# Patient Record
Sex: FEMALE | Race: WHITE | NOT HISPANIC OR LATINO | Employment: FULL TIME | URBAN - METROPOLITAN AREA
[De-identification: names, ages, dates, MRNs, and addresses within clinical notes are randomized per-mention and may not be internally consistent; named-entity substitution may affect disease eponyms.]

---

## 2017-06-28 ENCOUNTER — RECORDS - HEALTHEAST (OUTPATIENT)
Dept: LAB | Facility: CLINIC | Age: 52
End: 2017-06-28

## 2017-06-28 LAB
CHOLEST SERPL-MCNC: 230 MG/DL
FASTING STATUS PATIENT QL REPORTED: NO
HDLC SERPL-MCNC: 57 MG/DL
LDLC SERPL CALC-MCNC: 136 MG/DL
TRIGL SERPL-MCNC: 184 MG/DL

## 2017-08-23 ENCOUNTER — TRANSFERRED RECORDS (OUTPATIENT)
Dept: HEALTH INFORMATION MANAGEMENT | Facility: CLINIC | Age: 52
End: 2017-08-23

## 2018-12-05 ENCOUNTER — RECORDS - HEALTHEAST (OUTPATIENT)
Dept: LAB | Facility: CLINIC | Age: 53
End: 2018-12-05

## 2018-12-05 LAB
CHOLEST SERPL-MCNC: 273 MG/DL
FASTING STATUS PATIENT QL REPORTED: ABNORMAL
HDLC SERPL-MCNC: 78 MG/DL
LDLC SERPL CALC-MCNC: 172 MG/DL
TRIGL SERPL-MCNC: 115 MG/DL
TSH SERPL DL<=0.005 MIU/L-ACNC: 1.9 UIU/ML (ref 0.3–5)

## 2018-12-06 LAB
HPV SOURCE: NORMAL
HUMAN PAPILLOMA VIRUS 16 DNA: NEGATIVE
HUMAN PAPILLOMA VIRUS 18 DNA: NEGATIVE
HUMAN PAPILLOMA VIRUS FINAL DIAGNOSIS: NORMAL
HUMAN PAPILLOMA VIRUS OTHER HR: NEGATIVE
SPECIMEN DESCRIPTION: NORMAL

## 2018-12-12 LAB
BKR LAB AP ABNORMAL BLEEDING: NO
BKR LAB AP BIRTH CONTROL/HORMONES: NORMAL
BKR LAB AP CERVICAL APPEARANCE: NORMAL
BKR LAB AP GYN ADEQUACY: NORMAL
BKR LAB AP GYN INTERPRETATION: NORMAL
BKR LAB AP HPV REFLEX: NORMAL
BKR LAB AP LMP: NORMAL
BKR LAB AP PATIENT STATUS: NORMAL
BKR LAB AP PREVIOUS ABNORMAL: NORMAL
BKR LAB AP PREVIOUS NORMAL: 2014
HIGH RISK?: NO
PATH REPORT.COMMENTS IMP SPEC: NORMAL
RESULT FLAG (HE HISTORICAL CONVERSION): NORMAL

## 2019-03-06 ENCOUNTER — RECORDS - HEALTHEAST (OUTPATIENT)
Dept: LAB | Facility: CLINIC | Age: 54
End: 2019-03-06

## 2019-03-07 LAB — 25(OH)D3 SERPL-MCNC: 70.3 NG/ML (ref 30–80)

## 2020-10-08 ENCOUNTER — RECORDS - HEALTHEAST (OUTPATIENT)
Dept: LAB | Facility: CLINIC | Age: 55
End: 2020-10-08

## 2020-10-09 LAB
MEV IGG SER IA-ACNC: POSITIVE
MUV IGG SER QL IA: POSITIVE
RUBV IGG SERPL QL IA: POSITIVE

## 2021-06-16 ENCOUNTER — RECORDS - HEALTHEAST (OUTPATIENT)
Dept: LAB | Facility: CLINIC | Age: 56
End: 2021-06-16

## 2021-06-16 LAB
SARS-COV-2 PCR COMMENT: NORMAL
SARS-COV-2 RNA SPEC QL NAA+PROBE: NEGATIVE
SARS-COV-2 VIRUS SPECIMEN SOURCE: NORMAL

## 2022-05-02 ENCOUNTER — LAB REQUISITION (OUTPATIENT)
Dept: LAB | Facility: CLINIC | Age: 57
End: 2022-05-02

## 2022-05-02 DIAGNOSIS — R25.3 FASCICULATION: ICD-10-CM

## 2022-05-02 DIAGNOSIS — E78.2 MIXED HYPERLIPIDEMIA: ICD-10-CM

## 2022-05-02 LAB
ANION GAP SERPL CALCULATED.3IONS-SCNC: 11 MMOL/L (ref 5–18)
BUN SERPL-MCNC: 8 MG/DL (ref 8–22)
CALCIUM SERPL-MCNC: 9.4 MG/DL (ref 8.5–10.5)
CHLORIDE BLD-SCNC: 105 MMOL/L (ref 98–107)
CHOLEST SERPL-MCNC: 298 MG/DL
CO2 SERPL-SCNC: 24 MMOL/L (ref 22–31)
CREAT SERPL-MCNC: 0.85 MG/DL (ref 0.6–1.1)
GFR SERPL CREATININE-BSD FRML MDRD: 80 ML/MIN/1.73M2
GLUCOSE BLD-MCNC: 92 MG/DL (ref 70–125)
HDLC SERPL-MCNC: 73 MG/DL
LDLC SERPL CALC-MCNC: 195 MG/DL
MAGNESIUM SERPL-MCNC: 2 MG/DL (ref 1.8–2.6)
POTASSIUM BLD-SCNC: 4.9 MMOL/L (ref 3.5–5)
SODIUM SERPL-SCNC: 140 MMOL/L (ref 136–145)
TRIGL SERPL-MCNC: 150 MG/DL

## 2022-05-02 PROCEDURE — 83735 ASSAY OF MAGNESIUM: CPT | Performed by: FAMILY MEDICINE

## 2022-05-02 PROCEDURE — 80048 BASIC METABOLIC PNL TOTAL CA: CPT | Performed by: FAMILY MEDICINE

## 2022-05-02 PROCEDURE — 80061 LIPID PANEL: CPT | Performed by: FAMILY MEDICINE

## 2022-06-11 ENCOUNTER — TRANSFERRED RECORDS (OUTPATIENT)
Dept: MULTI SPECIALTY CLINIC | Facility: CLINIC | Age: 57
End: 2022-06-11

## 2022-07-15 ENCOUNTER — LAB REQUISITION (OUTPATIENT)
Dept: LAB | Facility: CLINIC | Age: 57
End: 2022-07-15
Payer: COMMERCIAL

## 2022-07-15 DIAGNOSIS — G44.52 NEW DAILY PERSISTENT HEADACHE (NDPH): ICD-10-CM

## 2022-07-15 DIAGNOSIS — Z20.822 CONTACT WITH AND (SUSPECTED) EXPOSURE TO COVID-19: ICD-10-CM

## 2022-07-15 PROCEDURE — U0003 INFECTIOUS AGENT DETECTION BY NUCLEIC ACID (DNA OR RNA); SEVERE ACUTE RESPIRATORY SYNDROME CORONAVIRUS 2 (SARS-COV-2) (CORONAVIRUS DISEASE [COVID-19]), AMPLIFIED PROBE TECHNIQUE, MAKING USE OF HIGH THROUGHPUT TECHNOLOGIES AS DESCRIBED BY CMS-2020-01-R: HCPCS | Mod: ORL | Performed by: FAMILY MEDICINE

## 2022-07-17 LAB — SARS-COV-2 RNA RESP QL NAA+PROBE: NEGATIVE

## 2022-11-04 ENCOUNTER — LAB REQUISITION (OUTPATIENT)
Dept: LAB | Facility: CLINIC | Age: 57
End: 2022-11-04

## 2022-11-04 DIAGNOSIS — E78.2 MIXED HYPERLIPIDEMIA: ICD-10-CM

## 2022-11-04 PROCEDURE — 80053 COMPREHEN METABOLIC PANEL: CPT | Performed by: PHYSICIAN ASSISTANT

## 2022-11-04 PROCEDURE — 80061 LIPID PANEL: CPT | Performed by: PHYSICIAN ASSISTANT

## 2022-11-04 PROCEDURE — 84295 ASSAY OF SERUM SODIUM: CPT | Performed by: PHYSICIAN ASSISTANT

## 2022-11-06 LAB
ALBUMIN SERPL BCG-MCNC: 4.4 G/DL (ref 3.5–5.2)
ALP SERPL-CCNC: 58 U/L (ref 35–104)
ALT SERPL W P-5'-P-CCNC: 19 U/L (ref 10–35)
ANION GAP SERPL CALCULATED.3IONS-SCNC: 14 MMOL/L (ref 7–15)
AST SERPL W P-5'-P-CCNC: 19 U/L (ref 10–35)
BILIRUB SERPL-MCNC: 0.3 MG/DL
BUN SERPL-MCNC: 10.7 MG/DL (ref 6–20)
CALCIUM SERPL-MCNC: 9.4 MG/DL (ref 8.6–10)
CHLORIDE SERPL-SCNC: 103 MMOL/L (ref 98–107)
CHOLEST SERPL-MCNC: 170 MG/DL
CREAT SERPL-MCNC: 0.71 MG/DL (ref 0.51–0.95)
DEPRECATED HCO3 PLAS-SCNC: 20 MMOL/L (ref 22–29)
GFR SERPL CREATININE-BSD FRML MDRD: >90 ML/MIN/1.73M2
GLUCOSE SERPL-MCNC: 81 MG/DL (ref 70–99)
HDLC SERPL-MCNC: 67 MG/DL
LDLC SERPL CALC-MCNC: 68 MG/DL
NONHDLC SERPL-MCNC: 103 MG/DL
POTASSIUM SERPL-SCNC: 4.2 MMOL/L (ref 3.4–5.3)
PROT SERPL-MCNC: 6.3 G/DL (ref 6.4–8.3)
SODIUM SERPL-SCNC: 137 MMOL/L (ref 136–145)
TRIGL SERPL-MCNC: 173 MG/DL

## 2022-12-05 ENCOUNTER — TRANSFERRED RECORDS (OUTPATIENT)
Dept: HEALTH INFORMATION MANAGEMENT | Facility: CLINIC | Age: 57
End: 2022-12-05

## 2024-01-26 ENCOUNTER — OFFICE VISIT (OUTPATIENT)
Dept: FAMILY MEDICINE | Facility: CLINIC | Age: 59
End: 2024-01-26
Payer: COMMERCIAL

## 2024-01-26 VITALS
SYSTOLIC BLOOD PRESSURE: 106 MMHG | RESPIRATION RATE: 14 BRPM | TEMPERATURE: 99 F | OXYGEN SATURATION: 95 % | DIASTOLIC BLOOD PRESSURE: 74 MMHG | HEART RATE: 80 BPM

## 2024-01-26 DIAGNOSIS — Z79.899 MEDICATION MANAGEMENT: ICD-10-CM

## 2024-01-26 DIAGNOSIS — E78.2 MIXED HYPERLIPIDEMIA: ICD-10-CM

## 2024-01-26 DIAGNOSIS — E04.1 THYROID NODULE: ICD-10-CM

## 2024-01-26 DIAGNOSIS — C43.9 MELANOMA OF SKIN (H): ICD-10-CM

## 2024-01-26 DIAGNOSIS — Z13.1 DIABETES MELLITUS SCREENING: ICD-10-CM

## 2024-01-26 DIAGNOSIS — G44.83 COUGH HEADACHE: ICD-10-CM

## 2024-01-26 DIAGNOSIS — Z12.4 CERVICAL CANCER SCREENING: ICD-10-CM

## 2024-01-26 DIAGNOSIS — Z00.00 ENCOUNTER FOR ROUTINE HISTORY AND PHYSICAL EXAM IN FEMALE: Primary | ICD-10-CM

## 2024-01-26 DIAGNOSIS — F33.0 MILD EPISODE OF RECURRENT MAJOR DEPRESSIVE DISORDER (H): ICD-10-CM

## 2024-01-26 DIAGNOSIS — N60.99 ATYPICAL DUCTAL HYPERPLASIA OF BREAST: ICD-10-CM

## 2024-01-26 DIAGNOSIS — R93.1 ELEVATED CORONARY ARTERY CALCIUM SCORE: ICD-10-CM

## 2024-01-26 DIAGNOSIS — F41.9 ANXIETY: ICD-10-CM

## 2024-01-26 LAB
ALBUMIN UR-MCNC: NEGATIVE MG/DL
APPEARANCE UR: CLEAR
BACTERIA #/AREA URNS HPF: ABNORMAL /HPF
BILIRUB UR QL STRIP: NEGATIVE
COLOR UR AUTO: YELLOW
GLUCOSE UR STRIP-MCNC: NEGATIVE MG/DL
HGB UR QL STRIP: NEGATIVE
KETONES UR STRIP-MCNC: NEGATIVE MG/DL
LEUKOCYTE ESTERASE UR QL STRIP: ABNORMAL
NITRATE UR QL: NEGATIVE
PH UR STRIP: 6.5 [PH] (ref 5–8)
RBC #/AREA URNS AUTO: ABNORMAL /HPF
SP GR UR STRIP: 1.01 (ref 1–1.03)
SQUAMOUS #/AREA URNS AUTO: ABNORMAL /LPF
UROBILINOGEN UR STRIP-ACNC: 0.2 E.U./DL
WBC #/AREA URNS AUTO: ABNORMAL /HPF

## 2024-01-26 PROCEDURE — 99386 PREV VISIT NEW AGE 40-64: CPT | Performed by: NURSE PRACTITIONER

## 2024-01-26 PROCEDURE — 81001 URINALYSIS AUTO W/SCOPE: CPT | Performed by: NURSE PRACTITIONER

## 2024-01-26 PROCEDURE — 87624 HPV HI-RISK TYP POOLED RSLT: CPT | Performed by: NURSE PRACTITIONER

## 2024-01-26 PROCEDURE — G0123 SCREEN CERV/VAG THIN LAYER: HCPCS | Performed by: NURSE PRACTITIONER

## 2024-01-26 PROCEDURE — 99214 OFFICE O/P EST MOD 30 MIN: CPT | Mod: 25 | Performed by: NURSE PRACTITIONER

## 2024-01-26 RX ORDER — SERTRALINE HYDROCHLORIDE 100 MG/1
200 TABLET, FILM COATED ORAL DAILY
COMMUNITY
Start: 2023-06-30

## 2024-01-26 RX ORDER — BUPROPION HYDROCHLORIDE 300 MG/1
1 TABLET ORAL DAILY
COMMUNITY
Start: 2023-10-21 | End: 2024-03-21

## 2024-01-26 RX ORDER — TOPIRAMATE 25 MG/1
25 TABLET, FILM COATED ORAL 2 TIMES DAILY
COMMUNITY
Start: 2023-10-24

## 2024-01-26 RX ORDER — ATORVASTATIN CALCIUM 40 MG/1
40 TABLET, FILM COATED ORAL DAILY
COMMUNITY
Start: 2022-07-21 | End: 2024-08-12

## 2024-01-26 ASSESSMENT — PAIN SCALES - GENERAL: PAINLEVEL: NO PAIN (0)

## 2024-01-26 NOTE — PROGRESS NOTES
Assessment and Plan:    Encounter for routine history and physical exam in female  Recommend consuming a healthy diet and exercising.  She declines HIV and hepatitis C screening.  Unfortunately, we do not have her vaccine record.  Will try to obtain this.   has had a colonoscopy in the last few years.  - CBC with platelets  - UA Macroscopic with reflex to Microscopic and Culture  - UA Macroscopic with reflex to Microscopic and Culture    Diabetes mellitus screening  - Hemoglobin A1c    Cervical cancer screening  - Pap Screen with HPV - recommended age 30 - 65 years    Atypical ductal hyperplasia of breast  Patient states she obtains breast MRIs and mammograms every 6 months.  Offered referral to breast specialist, but she declines.  She has this done at [facility].  - MR Breast Bilateral w/o & w Contrast  - MA Diagnostic Digital Bilateral    Mixed hyperlipidemia  She continues atorvastatin.  Goal LDL is less than 70.  - Lipid panel reflex to direct LDL Fasting    Thyroid nodule  Will check thyroid cascade.  She had a thyroid biopsy which was benign.  - TSH with free T4 reflex    Elevated coronary artery calcium score  Patient continues atorvastatin.  Will refer to cardiology.  - Adult Cardiology Eval  Referral    Melanoma of skin (H)  Patient is followed by dermatology.  - Adult Dermatology  Referral  - Adult Dermatology  Referral    Cough headache  Patient continues topiramate.  Patient is followed by neurology.  - Adult Neurology  Referral    Anxiety  Mild episode of recurrent major depressive disorder (H24)  Patient states this is controlled with bupropion and sertraline.    Medication management  - Comprehensive metabolic panel      Subjective:     Angelina is a 58 year old female presenting to the clinic for a female physical.     LMP: in her early 50's  Hx of abnormal pap smear: 22 years ago, colposcopy; normal since   Last pap smear: 2/5/18 normal, negative HPV    Perform self-breast exams: yes   Vaginal discharge or irritation: none  Sexually active: yes,  since 1994   Contraception: none   Concerns for STDs: none   Previous pregnancies:three pregnancies (two vaginal, 1 miscarriage)  One daughter and one son     Patient has a history of atypical ductal hyperplasia within the right breast 16 years ago via a breast biopsy.  The patient was seen at [facility] and has a breast MRI and mammogram every 6 months.  Patient has a history of an elevated cardiac calcium score of 145 on 6/21/2022.  She is taking atorvastatin 40 mg daily.  Occasionally, she developed shortness of breath with walking up a flight of stairs.  She denies chest pain or chest tightness.  Her father had elevated cholesterol and a stroke.  Patient is taking bupropion and sertraline for anxiety and depression.  She feels as though her mood is stable.  She denies thoughts of suicide.  She feels supported.  She sees neuro and neurology for a cough headache.  She takes topiramate daily.  She has a history of melanoma and is followed by advanced dermatology.    Review of systems:  I performed a 10 point review of systems.  All pertinent positives and negatives are noted in the HPI. All others are negative.     Allergies   Allergen Reactions    Morphine And Related Unknown     nausea    Upset stomach       Current Outpatient Medications   Medication    atorvastatin (LIPITOR) 40 MG tablet    buPROPion (WELLBUTRIN XL) 300 MG 24 hr tablet    Calcium Carbonate-Vitamin D 250-3.125 MG-MCG TABS    sertraline (ZOLOFT) 100 MG tablet    topiramate (TOPAMAX) 25 MG tablet     No current facility-administered medications for this visit.       Social History     Socioeconomic History    Marital status:      Spouse name: Not on file    Number of children: Not on file    Years of education: Not on file    Highest education level: Not on file   Occupational History    Not on file   Tobacco Use    Smoking status:  Never    Smokeless tobacco: Never   Vaping Use    Vaping Use: Never used   Substance and Sexual Activity    Alcohol use: Not Currently    Drug use: Never    Sexual activity: Not on file   Other Topics Concern    Not on file   Social History Narrative    Not on file     Social Determinants of Health     Financial Resource Strain: Low Risk  (1/26/2024)    Financial Resource Strain     Within the past 12 months, have you or your family members you live with been unable to get utilities (heat, electricity) when it was really needed?: No   Food Insecurity: Low Risk  (1/26/2024)    Food Insecurity     Within the past 12 months, did you worry that your food would run out before you got money to buy more?: No     Within the past 12 months, did the food you bought just not last and you didn t have money to get more?: No   Transportation Needs: Low Risk  (1/26/2024)    Transportation Needs     Within the past 12 months, has lack of transportation kept you from medical appointments, getting your medicines, non-medical meetings or appointments, work, or from getting things that you need?: No   Physical Activity: Not on file   Stress: Not on file   Social Connections: Not on file   Interpersonal Safety: Low Risk  (1/26/2024)    Interpersonal Safety     Do you feel physically and emotionally safe where you currently live?: Yes     Within the past 12 months, have you been hit, slapped, kicked or otherwise physically hurt by someone?: No     Within the past 12 months, have you been humiliated or emotionally abused in other ways by your partner or ex-partner?: No   Housing Stability: Low Risk  (1/26/2024)    Housing Stability     Do you have housing? : Yes     Are you worried about losing your housing?: No       History reviewed. No pertinent past medical history.    Family History   Problem Relation Age of Onset    Hyperlipidemia Father     Colon Cancer Father        Past Surgical History:   Procedure Laterality Date    BREAST  BIOPSY, CORE RT/LT      CHOLECYSTECTOMY      SURGICAL PATHOLOGY EXAM      THYROIDECTOMY, PARTIAL         Objective:     /74 (BP Location: Right arm, Patient Position: Sitting, Cuff Size: Adult Large)   Pulse 80   Temp 99  F (37.2  C) (Temporal)   Resp 14   SpO2 95%     Patient is alert, no obvious distress.   Skin: Warm, dry.  No rashes or lesions. Skin turgor rapid return.   HEENT:  Eyes normal.  Ears normal.  Nose patent, mucosa pink.  Oropharynx mucosa pink, no lesions or tonsil enlargement.   Neck:  Supple, without lymphadenopathy, bruits, JVD. Thyroid normal texture and size.    Lungs:  Clear to auscultation.  No wheezing, rales noted.  Respirations even and unlabored.   Heart:  Regular rate and rhythm.  No murmurs.   Breasts:  Normal.  No surrounding adenopathy.   Abdomen: Soft, nontender.  No organomegaly.  Bowel sounds normoactive.  No guarding or masses noted.   :  External genitalia normal.  Normal vaginal mucosa.  Cervix no lesions or cervical motion tenderness.  Musculoskeletal:  Full ROM of extremities.  Muscle strength equal +5/5.   Neurological:  Cranial nerves 2-12 intact.              Answers submitted by the patient for this visit:  General Questionnaire (Submitted on 1/26/2024)  Chief Complaint: Chronic problems general questions HPI Form  What is the reason for your visit today? : new patient  How many servings of fruits and vegetables do you eat daily?: 2-3  On average, how many sweetened beverages do you drink each day (Examples: soda, juice, sweet tea, etc.  Do NOT count diet or artificially sweetened beverages)?: 2  How many minutes a day do you exercise enough to make your heart beat faster?: 10 to 19  How many days a week do you exercise enough to make your heart beat faster?: 4  How many days per week do you miss taking your medication?: 7

## 2024-01-29 ENCOUNTER — TELEPHONE (OUTPATIENT)
Dept: FAMILY MEDICINE | Facility: CLINIC | Age: 59
End: 2024-01-29
Payer: COMMERCIAL

## 2024-01-29 NOTE — TELEPHONE ENCOUNTER
Writer called patient and states she goes to [facility] for all of her breast imaging.   Patient would like imaging orders sent to [facility] in [place].    MARCELO Dougherty, RN  [facility]

## 2024-01-29 NOTE — TELEPHONE ENCOUNTER
"Informed Mariam at [facility] of the following notes:  Per last OV notes on 1/26/24:  \"Atypical ductal hyperplasia of breast  Patient states she obtains breast MRIs and mammograms every 6 months.  Offered referral to breast specialist, but she declines.  She has [facility] done at [facility].  - MR Breast Bilateral w/o & w Contrast  - MA Diagnostic Digital Bilateral\"    And:  \"Patient has a history of atypical ductal hyperplasia within the right breast 16 years ago via a breast biopsy.  The patient was seen at [facility] and has a breast MRI and mammogram every 6 months.\"    If pt is not having new concerns or symptoms, then it will just be a screening not diagnostic mammogram per Mariam at the [facility]. They will need a screening mammogram order then.    Mariam can't tell if pt is to have breast MRI at [facility] or not, but they will need a screening mammogram before they can do the breast MRI or diagnostic with ultrasound (if having current symptoms).    Mariam does not think [facility] does breast MRI, so if pt is getting screening mammogram done through [facility], but needs breast MRI at [facility] then they need screening images and report from [facility].     Mariam is wondering if provider Bryanna is wanting the 3-D screening mammogram? If pt is not having new symptoms then they will not do the diagnostic mammogram and should only be a screening mammogram, which it can be done in 3-D if that's what provider wanted.    Routing to provider to review and advise.    Laura Robertson, WARRENN, RN  [facility]  "

## 2024-01-29 NOTE — TELEPHONE ENCOUNTER
General Call      Reason for Call:  order clarification-mammogram    What are your questions or concerns:  Mariam calling from imaging to clarify orders as unless a new symptom, this should be screening mammogram.    Please advise and /or send new order

## 2024-01-29 NOTE — TELEPHONE ENCOUNTER
Please reach out to patient regarding all of this.  Patient was insistent that she has mammograms and breast MRI's at [facility]. Please let me know what the patient decides and I can change the order. Thanks.

## 2024-01-30 ENCOUNTER — TELEPHONE (OUTPATIENT)
Dept: DERMATOLOGY | Facility: CLINIC | Age: 59
End: 2024-01-30
Payer: COMMERCIAL

## 2024-01-30 NOTE — TELEPHONE ENCOUNTER
Called and spoke with pt about her referral to   [facility]     Pt is established there and had no issues with scheduling.     Marking referral as complete.     Toshia Gudino, Procedure  1/30/2024 8:38 AM

## 2024-01-30 NOTE — TELEPHONE ENCOUNTER
Orders printed and faxed to [facility] in [place].    Leticia Corral, WARRENN, RN  [facility]

## 2024-02-01 ENCOUNTER — LAB (OUTPATIENT)
Dept: LAB | Facility: CLINIC | Age: 59
End: 2024-02-01
Payer: COMMERCIAL

## 2024-02-01 DIAGNOSIS — E78.2 MIXED HYPERLIPIDEMIA: ICD-10-CM

## 2024-02-01 DIAGNOSIS — Z13.1 DIABETES MELLITUS SCREENING: ICD-10-CM

## 2024-02-01 DIAGNOSIS — Z79.899 MEDICATION MANAGEMENT: ICD-10-CM

## 2024-02-01 DIAGNOSIS — E04.1 THYROID NODULE: ICD-10-CM

## 2024-02-01 DIAGNOSIS — Z00.00 ENCOUNTER FOR ROUTINE HISTORY AND PHYSICAL EXAM IN FEMALE: ICD-10-CM

## 2024-02-01 PROBLEM — R73.03 PREDIABETES: Status: ACTIVE | Noted: 2024-02-01

## 2024-02-01 LAB
BKR LAB AP GYN ADEQUACY: NORMAL
BKR LAB AP GYN INTERPRETATION: NORMAL
BKR LAB AP HPV REFLEX: NORMAL
BKR LAB AP PREVIOUS ABNORMAL: NORMAL
ERYTHROCYTE [DISTWIDTH] IN BLOOD BY AUTOMATED COUNT: 13.5 % (ref 10–15)
HBA1C MFR BLD: 5.8 % (ref 0–5.6)
HCT VFR BLD AUTO: 43.2 % (ref 35–47)
HGB BLD-MCNC: 14.5 G/DL (ref 11.7–15.7)
MCH RBC QN AUTO: 32.1 PG (ref 26.5–33)
MCHC RBC AUTO-ENTMCNC: 33.6 G/DL (ref 31.5–36.5)
MCV RBC AUTO: 96 FL (ref 78–100)
PATH REPORT.COMMENTS IMP SPEC: NORMAL
PATH REPORT.COMMENTS IMP SPEC: NORMAL
PATH REPORT.RELEVANT HX SPEC: NORMAL
PLATELET # BLD AUTO: 221 10E3/UL (ref 150–450)
RBC # BLD AUTO: 4.52 10E6/UL (ref 3.8–5.2)
WBC # BLD AUTO: 8 10E3/UL (ref 4–11)

## 2024-02-01 PROCEDURE — 85027 COMPLETE CBC AUTOMATED: CPT

## 2024-02-01 PROCEDURE — 80061 LIPID PANEL: CPT

## 2024-02-01 PROCEDURE — 36415 COLL VENOUS BLD VENIPUNCTURE: CPT

## 2024-02-01 PROCEDURE — 84443 ASSAY THYROID STIM HORMONE: CPT

## 2024-02-01 PROCEDURE — 80053 COMPREHEN METABOLIC PANEL: CPT

## 2024-02-01 PROCEDURE — 83036 HEMOGLOBIN GLYCOSYLATED A1C: CPT

## 2024-02-02 ENCOUNTER — TRANSFERRED RECORDS (OUTPATIENT)
Dept: HEALTH INFORMATION MANAGEMENT | Facility: CLINIC | Age: 59
End: 2024-02-02
Payer: COMMERCIAL

## 2024-02-02 LAB
ALBUMIN SERPL BCG-MCNC: 4.5 G/DL (ref 3.5–5.2)
ALP SERPL-CCNC: 69 U/L (ref 40–150)
ALT SERPL W P-5'-P-CCNC: 27 U/L (ref 0–50)
ANION GAP SERPL CALCULATED.3IONS-SCNC: 11 MMOL/L (ref 7–15)
AST SERPL W P-5'-P-CCNC: 25 U/L (ref 0–45)
BILIRUB SERPL-MCNC: 0.5 MG/DL
BUN SERPL-MCNC: 12.3 MG/DL (ref 6–20)
CALCIUM SERPL-MCNC: 9.5 MG/DL (ref 8.6–10)
CHLORIDE SERPL-SCNC: 111 MMOL/L (ref 98–107)
CHOLEST SERPL-MCNC: 212 MG/DL
CREAT SERPL-MCNC: 1.03 MG/DL (ref 0.51–0.95)
DEPRECATED HCO3 PLAS-SCNC: 22 MMOL/L (ref 22–29)
EGFRCR SERPLBLD CKD-EPI 2021: 63 ML/MIN/1.73M2
FASTING STATUS PATIENT QL REPORTED: YES
GLUCOSE SERPL-MCNC: 101 MG/DL (ref 70–99)
HDLC SERPL-MCNC: 81 MG/DL
HUMAN PAPILLOMA VIRUS 16 DNA: NEGATIVE
HUMAN PAPILLOMA VIRUS 18 DNA: NEGATIVE
HUMAN PAPILLOMA VIRUS FINAL DIAGNOSIS: NORMAL
HUMAN PAPILLOMA VIRUS OTHER HR: NEGATIVE
LDLC SERPL CALC-MCNC: 105 MG/DL
NONHDLC SERPL-MCNC: 131 MG/DL
POTASSIUM SERPL-SCNC: 4.8 MMOL/L (ref 3.4–5.3)
PROT SERPL-MCNC: 7 G/DL (ref 6.4–8.3)
SODIUM SERPL-SCNC: 144 MMOL/L (ref 135–145)
TRIGL SERPL-MCNC: 130 MG/DL
TSH SERPL DL<=0.005 MIU/L-ACNC: 3.56 UIU/ML (ref 0.3–4.2)

## 2024-02-05 DIAGNOSIS — R73.03 PREDIABETES: Primary | ICD-10-CM

## 2024-02-05 DIAGNOSIS — Z78.0 POSTMENOPAUSAL STATUS: Primary | ICD-10-CM

## 2024-02-05 PROBLEM — R87.619 ABNORMAL PAP SMEAR OF CERVIX: Status: ACTIVE | Noted: 2024-02-05

## 2024-02-15 ENCOUNTER — TRANSFERRED RECORDS (OUTPATIENT)
Dept: HEALTH INFORMATION MANAGEMENT | Facility: CLINIC | Age: 59
End: 2024-02-15
Payer: COMMERCIAL

## 2024-03-07 ENCOUNTER — VIRTUAL VISIT (OUTPATIENT)
Dept: EDUCATION SERVICES | Facility: CLINIC | Age: 59
End: 2024-03-07
Attending: NURSE PRACTITIONER
Payer: COMMERCIAL

## 2024-03-07 DIAGNOSIS — R73.03 PREDIABETES: ICD-10-CM

## 2024-03-07 PROCEDURE — 97802 MEDICAL NUTRITION INDIV IN: CPT | Mod: 95 | Performed by: NUTRITIONIST

## 2024-03-07 NOTE — LETTER
3/7/2024         RE: Angelina Sim  [address] [place] [address] [facility] [address]        Dear Colleague,    Thank you for referring your patient, Angelina Sim, to the [facility]. Please see a copy of my visit note below.    Medical Nutrition Therapy  Visit Type:Initial assessment and intervention    Angelina Sim presents today for MNT and education related to prediabetes.   She is accompanied by self.     ASSESSMENT:   Patient comments/concerns relating to nutrition: Kandy did not want to do an assessment on her Nutrition History was interested more in recommendations for keeping her from developing Type 2 diabetes.      NUTRITION HISTORY:  Patient declined    Eats out:  1 meals/per week     Previous diet education:  Yes     Food allergies/intolerances: No      EXERCISE: moderate regular exercise program which includes walking 20-30 minutes per day and no current resistance activity    SOCIO/ECONOMIC:   Lives with: spouse    MEDICATIONS:  Current Outpatient Medications   Medication     atorvastatin (LIPITOR) 40 MG tablet     buPROPion (WELLBUTRIN XL) 300 MG 24 hr tablet     Calcium Carbonate-Vitamin D 250-3.125 MG-MCG TABS     sertraline (ZOLOFT) 100 MG tablet     topiramate (TOPAMAX) 25 MG tablet     No current facility-administered medications for this visit.       LABS:  Lab Results   Component Value Date     02/01/2024      Lab Results   Component Value Date    POTASSIUM 4.8 02/01/2024    POTASSIUM 4.9 05/02/2022     Lab Results   Component Value Date    CHLORIDE 111 02/01/2024    CHLORIDE 105 05/02/2022     Lab Results   Component Value Date    ADILSON 9.5 02/01/2024     Lab Results   Component Value Date    CO2 22 02/01/2024    CO2 24 05/02/2022     Lab Results   Component Value Date    BUN 12.3 02/01/2024    BUN 8 05/02/2022     Lab Results   Component Value Date    CR 1.03 02/01/2024     Lab Results   Component Value Date     02/01/2024    GLC 92  "05/02/2022     Lab Results   Component Value Date     02/01/2024     Direct Measure HDL   Date Value Ref Range Status   02/01/2024 81 >=50 mg/dL Final   ]  GFR Estimate   Date Value Ref Range Status   02/01/2024 63 >60 mL/min/1.73m2 Final     Lab Results   Component Value Date    CR 1.03 02/01/2024     No results found for: \"MICROALBUMIN\"    ANTHROPOMETRICS:  Vitals: There were no vitals taken for this visit.  There is no height or weight on file to calculate BMI.      Wt Readings from Last 5 Encounters:   No data found for Wt       Weight Change: No weight on file, declines to be weighed    ESTIMATED KCAL REQUIREMENTS:  Unable to calculate     NUTRITION DIAGNOSIS: Food- and nutrition-related knowledge deficit related to unsupported nutrition beliefs/attitudes as evidenced by Inability to apply information    NUTRITION INTERVENTION:  Education given to support: general nutrition guidelines, carb counting, and portion control    PATIENT'S BEHAVIOR CHANGE GOALS:   See Patient Instructions for patient stated behavior change goals. AVS was printed and given to patient at today's appointment.    MONITOR / EVALUATE:  RD will monitor/evaluate:  Beliefs and attitudes related to food  Blood Glucose / A1c    FOLLOW-UP:  Follow-up appointment scheduled on 4/25/24.     Anne-Marie Barakat, CHARANJIT, LD, CDCES   Time spent in minutes: 60  Encounter: Individual      "

## 2024-03-07 NOTE — PROGRESS NOTES
Medical Nutrition Therapy  Visit Type:Initial assessment and intervention    Angelina Sim presents today for MNT and education related to prediabetes.   She is accompanied by self.     ASSESSMENT:   Patient comments/concerns relating to nutrition: Kandy did not want to do an assessment on her Nutrition History was interested more in recommendations for keeping her from developing Type 2 diabetes.      NUTRITION HISTORY:  Patient declined    Eats out:  1 meals/per week     Previous diet education:  Yes     Food allergies/intolerances: No      EXERCISE: moderate regular exercise program which includes walking 20-30 minutes per day and no current resistance activity    SOCIO/ECONOMIC:   Lives with: spouse    MEDICATIONS:  Current Outpatient Medications   Medication    atorvastatin (LIPITOR) 40 MG tablet    buPROPion (WELLBUTRIN XL) 300 MG 24 hr tablet    Calcium Carbonate-Vitamin D 250-3.125 MG-MCG TABS    sertraline (ZOLOFT) 100 MG tablet    topiramate (TOPAMAX) 25 MG tablet     No current facility-administered medications for this visit.       LABS:  Lab Results   Component Value Date     02/01/2024      Lab Results   Component Value Date    POTASSIUM 4.8 02/01/2024    POTASSIUM 4.9 05/02/2022     Lab Results   Component Value Date    CHLORIDE 111 02/01/2024    CHLORIDE 105 05/02/2022     Lab Results   Component Value Date    ADILSON 9.5 02/01/2024     Lab Results   Component Value Date    CO2 22 02/01/2024    CO2 24 05/02/2022     Lab Results   Component Value Date    BUN 12.3 02/01/2024    BUN 8 05/02/2022     Lab Results   Component Value Date    CR 1.03 02/01/2024     Lab Results   Component Value Date     02/01/2024    GLC 92 05/02/2022     Lab Results   Component Value Date     02/01/2024     Direct Measure HDL   Date Value Ref Range Status   02/01/2024 81 >=50 mg/dL Final   ]  GFR Estimate   Date Value Ref Range Status   02/01/2024 63 >60 mL/min/1.73m2 Final     Lab Results   Component  "Value Date    CR 1.03 02/01/2024     No results found for: \"MICROALBUMIN\"    ANTHROPOMETRICS:  Vitals: There were no vitals taken for this visit.  There is no height or weight on file to calculate BMI.      Wt Readings from Last 5 Encounters:   No data found for Wt       Weight Change: No weight on file, declines to be weighed    ESTIMATED KCAL REQUIREMENTS:  Unable to calculate     NUTRITION DIAGNOSIS: Food- and nutrition-related knowledge deficit related to unsupported nutrition beliefs/attitudes as evidenced by Inability to apply information    NUTRITION INTERVENTION:  Education given to support: general nutrition guidelines, carb counting, and portion control    PATIENT'S BEHAVIOR CHANGE GOALS:   See Patient Instructions for patient stated behavior change goals. AVS was printed and given to patient at today's appointment.    MONITOR / EVALUATE:  RD will monitor/evaluate:  Beliefs and attitudes related to food  Blood Glucose / A1c    FOLLOW-UP:  Follow-up appointment scheduled on 4/25/24.     Anne-Marie Barakat, CHARANJIT, LD, CDCES   Time spent in minutes: 60  Encounter: Individual    "

## 2024-03-07 NOTE — PATIENT INSTRUCTIONS
It was nice to talk with you today.  Here are some things we discussed:    Meal Plan Recommendation:   1) Continue with smaller portions    2) Limit carbohydrates at meals:  Aim for 3-4 carbohydrate choices per meal and 1 carbohydrate choice at snacks  (1 carbohydrate choice = 15 grams total carbohydrate)    3) Have protein with your meals, choose lean protein sources:  chicken without the skin, albacore tuna, turkey or ham slices, pork loin or pork chops with fat trimmed away, leanest hamburger you can purchase    4) Increase non starchy vegetables to 1/2 of your plate    5) Snacks ideas:  lean meat with string cheese and 4-5 whole wheat crackers, 1/2 cup cottage cheese with cup of fruit, hard boiled egg and 1/2 banana, small greek yogurt with handful of nuts    6) Discuss with PCP:  Referral to [facility], use of continuous glucose monitor, weight loss medications:  Wegovy or Zepbound    *Use diabetesEngage.org or [employer] for recipe and meal planning ideas  *Use [employer] peewee for carbohydrate reference.     I will send you a meal planning resources through the mail.    Your next appointment with me is:  4/25/24    Anne-Marie Barakat RD, LD, [facility]   Adult Diabetes Education Triage #[phone]  Diabetes Education Scheduling #[phone]

## 2024-03-18 ENCOUNTER — HOSPITAL ENCOUNTER (OUTPATIENT)
Dept: BONE DENSITY | Facility: HOSPITAL | Age: 59
Discharge: HOME | End: 2024-03-18
Attending: NURSE PRACTITIONER | Admitting: NURSE PRACTITIONER
Payer: COMMERCIAL

## 2024-03-18 DIAGNOSIS — Z78.0 POSTMENOPAUSAL STATUS: ICD-10-CM

## 2024-03-18 PROCEDURE — 77080 DXA BONE DENSITY AXIAL: CPT

## 2024-03-21 ENCOUNTER — OFFICE VISIT (OUTPATIENT)
Dept: CARDIOLOGY | Facility: CLINIC | Age: 59
End: 2024-03-21
Attending: NURSE PRACTITIONER
Payer: COMMERCIAL

## 2024-03-21 VITALS
WEIGHT: 214 LBS | HEIGHT: 65 IN | DIASTOLIC BLOOD PRESSURE: 68 MMHG | SYSTOLIC BLOOD PRESSURE: 98 MMHG | HEART RATE: 72 BPM | BODY MASS INDEX: 35.65 KG/M2 | RESPIRATION RATE: 16 BRPM

## 2024-03-21 DIAGNOSIS — E66.01 CLASS 2 SEVERE OBESITY DUE TO EXCESS CALORIES WITH SERIOUS COMORBIDITY AND BODY MASS INDEX (BMI) OF 35.0 TO 35.9 IN ADULT (H): ICD-10-CM

## 2024-03-21 DIAGNOSIS — R93.1 ELEVATED CORONARY ARTERY CALCIUM SCORE: ICD-10-CM

## 2024-03-21 DIAGNOSIS — E78.2 MIXED HYPERLIPIDEMIA: Primary | ICD-10-CM

## 2024-03-21 DIAGNOSIS — E66.812 CLASS 2 SEVERE OBESITY DUE TO EXCESS CALORIES WITH SERIOUS COMORBIDITY AND BODY MASS INDEX (BMI) OF 35.0 TO 35.9 IN ADULT (H): ICD-10-CM

## 2024-03-21 PROCEDURE — 93000 ELECTROCARDIOGRAM COMPLETE: CPT | Performed by: STUDENT IN AN ORGANIZED HEALTH CARE EDUCATION/TRAINING PROGRAM

## 2024-03-21 PROCEDURE — 99204 OFFICE O/P NEW MOD 45 MIN: CPT | Performed by: INTERNAL MEDICINE

## 2024-03-21 RX ORDER — LURASIDONE HYDROCHLORIDE 40 MG/1
80 TABLET, FILM COATED ORAL DAILY
COMMUNITY
Start: 2024-03-18

## 2024-03-21 NOTE — LETTER
3/21/2024    Bryanna Virgen, APRN CNP  [address]  [facility] [address]    RE: Angelina Sim       Dear Colleague,     I had the pleasure of seeing Angelina Sim in the [facility].    CARDIOLOGY CLINIC CONSULT NOTE     Assessment/Plan:   1.  Mixed hyperlipidemia.  Using American College of cardiology ASCVD risk  for 10-year risk of cardiovascular disease is 1.2% or 2.4% if diabetes is present.  This represents a low risk for future cardiac events, she was congratulated and reassured in this regard.  More aggressive lipid-lowering therapy by increasing her atorvastatin potentially to 80 mg daily is unlikely to significantly lower her cardiovascular risk going forward and is more likely to precipitate side effects.  Given her LDL of 68 on same medical regimen 1 year ago I would suggest she be more aggressive about working on her diet management and continue exercise rather than increased medical management at this time.  I would suggest long-term lipid-lowering therapy with a LDL target being under 100 given her coronary artery calcium on scanning.  These findings were discussed and patient is in agreement meant with this approach.    Follow-up as needed     History of Present Illness:     It is my pleasure to see Angelina Sim at the [facility] clinic for evaluation of mixed hyperlipidemia.    Angelina Sim is a 58 year old female with a past medical history of prediabetes mellitus, obesity, and mixed hyperlipidemia.  6/2022 she underwent coronary artery calcium testing.  This revealed a calcium score of 145 in the 96 percentile for her age.  At that time her total cholesterol was 298 with triglycerides of 150 and an LDL of 195.  She has been diagnosed with prediabetes and for weight control  started Mounjaro last week.  She initially responded well to atorvastatin 40 mg daily with an LDL reduction to 68, on the same dose her recent LDL  level was 105.  She is referred for further recommendations regarding treatment.    She does have a family history of stroke in her dad at age 70 but otherwise no history of premature coronary atherosclerosis.  She does exercise regularly walking 10,000 steps daily plus doing a video workout, and feels her activity tolerance is stable.  She is suspected of having sleep apnea but was unable to sleep during    Past Medical History:     Patient Active Problem List   Diagnosis    Atypical ductal hyperplasia of breast    Hypertriglyceridemia    Cough headache    Melanoma of skin (H)    Elevated coronary artery calcium score    Thyroid nodule    Anxiety    Mild episode of recurrent major depressive disorder (H24)    Prediabetes    Abnormal Pap smear of cervix       Past Surgical History:     Past Surgical History:   Procedure Laterality Date    BREAST BIOPSY, CORE RT/LT      CHOLECYSTECTOMY      SURGICAL PATHOLOGY EXAM      THYROIDECTOMY, PARTIAL         Family History:     Family History   Problem Relation Age of Onset    Hyperlipidemia Father     Colon Cancer Father      Family history reviewed and is not pertinent to the chief complaint or presenting problem    Social History:    reports that she has never smoked. She has never used smokeless tobacco. She reports that she does not currently use alcohol. She reports that she does not use drugs.    Exercise: Walks 10,000 steps a day does a video aerobics workout with stable activity tolerance    Sleep: Mostly restorative but does snore.  Occasional daytime sleepiness.  No naps.  Attempted sleep study but was unable to fall asleep during testing    Meds:     Current Outpatient Medications   Medication Sig Dispense Refill    atorvastatin (LIPITOR) 40 MG tablet Take 40 mg by mouth daily      Calcium Carbonate-Vitamin D 250-3.125 MG-MCG TABS Calcium Carb-Magnesium-Vit D2:  Take 1 cap by mouth.      lurasidone (LATUDA) 40 MG TABS tablet Take 40 mg by mouth daily       "sertraline (ZOLOFT) 100 MG tablet Take 200 mg by mouth daily      tirzepatide (MOUNJARO) 2.5 MG/0.5ML pen Inject 2.5 mg Subcutaneous every 7 days      topiramate (TOPAMAX) 25 MG tablet Take 25 mg by mouth 2 times daily         Allergies:   Morphine and related    Review of Systems:     Positive for shortness of breath with activity.  12 point review of systems otherwise negative     Please refer above for cardiac ROS details.       Objective:      Physical Exam  97.1 kg (214 lb)  1.655 m (5' 5.16\")  Body mass index is 35.44 kg/m .  BP 98/68 (BP Location: Right arm, Patient Position: Sitting, Cuff Size: Adult Large)   Pulse 72   Resp 16   Ht 1.655 m (5' 5.16\")   Wt 97.1 kg (214 lb)   BMI 35.44 kg/m          General Appearance : Awake, Alert, No acute distress  HEENT: No Scleral icterus; the mucous membranes were pink and moist.  Conjunctivae not injected  Neck:  No cervical bruits, jugular venous distention, or thyromegaly   Chest: The spine was straight. Chest wall symmetric  Lungs: Respirations unlabored; the lungs are clear to auscultation.  No wheezing   Cardiovascular:   Auscultation reveals normal first and second heart sounds with no murmurs, rubs, or gallops.  Carotid, radial, and dorsalis pedal pulses are intact and symmetric.    Abdomen: Obese.  No organomegaly, masses, bruits, or tenderness. Bowels sounds are present  Extremities: No edema  Skin: No xanthelasma. Warm, Dry.  Musculoskeletal: No tenderness.  Neurologic: Alert and oriented ×3. Speech is fluent.      EKG (personally reviewed):  Today: Sinus bradycardia 59 bpm.  Low voltage QRS.  Left axis deviation.  Nonspecific T wave changes.    Cardiac Imaging Studies:  CT coronary calcium screening at [facility] 6/2022:  2. Right dominant coronary arterial system.   3. Total Agatston calcium score 145 in LAD; 96th percentile.     Lab Review   Lab Results   Component Value Date     02/01/2024    CO2 22 02/01/2024    CO2 24 05/02/2022    BUN 12.3 " "02/01/2024    BUN 8 05/02/2022     Lab Results   Component Value Date    WBC 8.0 02/01/2024    HGB 14.5 02/01/2024    HCT 43.2 02/01/2024    MCV 96 02/01/2024     02/01/2024     Lab Results   Component Value Date    CHOL 212 02/01/2024    TRIG 130 02/01/2024    HDL 81 02/01/2024     No results found for: \"TROPONINI\"  No results found for: \"BNP\"  Lab Results   Component Value Date    TSH 3.56 02/01/2024    TSH 1.90 12/05/2018       Trung Fletcher MD, MD [facility]      This note created using Dragon voice recognition software. Sound alike errors may have escaped editing.        Thank you for allowing me to participate in the care of your patient.      Sincerely,     Trung Fletcher MD     [facility]  cc:   Bryanna Virgen, APRN CNP  [address]  [facility] [address]  [place] [address]      "

## 2024-03-21 NOTE — PROGRESS NOTES
CARDIOLOGY CLINIC CONSULT NOTE     Assessment/Plan:   1.  Mixed hyperlipidemia.  Using American College of cardiology ASCVD risk  for 10-year risk of cardiovascular disease is 1.2% or 2.4% if diabetes is present.  This represents a low risk for future cardiac events, she was congratulated and reassured in this regard.  More aggressive lipid-lowering therapy by increasing her atorvastatin potentially to 80 mg daily is unlikely to significantly lower her cardiovascular risk going forward and is more likely to precipitate side effects.  Given her LDL of 68 on same medical regimen 1 year ago I would suggest she be more aggressive about working on her diet management and continue exercise rather than increased medical management at this time.  I would suggest long-term lipid-lowering therapy with a LDL target being under 100 given her coronary artery calcium on scanning.  These findings were discussed and patient is in agreement meant with this approach.    Follow-up as needed     History of Present Illness:     It is my pleasure to see Angelina Sim at the [facility] clinic for evaluation of mixed hyperlipidemia.    Angelina Sim is a 58 year old female with a past medical history of prediabetes mellitus, obesity, and mixed hyperlipidemia.  6/2022 she underwent coronary artery calcium testing.  This revealed a calcium score of 145 in the 96 percentile for her age.  At that time her total cholesterol was 298 with triglycerides of 150 and an LDL of 195.  She has been diagnosed with prediabetes and for weight control  started Mounjaro last week.  She initially responded well to atorvastatin 40 mg daily with an LDL reduction to 68, on the same dose her recent LDL level was 105.  She is referred for further recommendations regarding treatment.    She does have a family history of stroke in her dad at age 70 but otherwise no history of premature coronary atherosclerosis.  She does  exercise regularly walking 10,000 steps daily plus doing a video workout, and feels her activity tolerance is stable.  She is suspected of having sleep apnea but was unable to sleep during    Past Medical History:     Patient Active Problem List   Diagnosis    Atypical ductal hyperplasia of breast    Hypertriglyceridemia    Cough headache    Melanoma of skin (H)    Elevated coronary artery calcium score    Thyroid nodule    Anxiety    Mild episode of recurrent major depressive disorder (H24)    Prediabetes    Abnormal Pap smear of cervix       Past Surgical History:     Past Surgical History:   Procedure Laterality Date    BREAST BIOPSY, CORE RT/LT      CHOLECYSTECTOMY      SURGICAL PATHOLOGY EXAM      THYROIDECTOMY, PARTIAL         Family History:     Family History   Problem Relation Age of Onset    Hyperlipidemia Father     Colon Cancer Father      Family history reviewed and is not pertinent to the chief complaint or presenting problem    Social History:    reports that she has never smoked. She has never used smokeless tobacco. She reports that she does not currently use alcohol. She reports that she does not use drugs.    Exercise: Walks 10,000 steps a day does a video aerobics workout with stable activity tolerance    Sleep: Mostly restorative but does snore.  Occasional daytime sleepiness.  No naps.  Attempted sleep study but was unable to fall asleep during testing    Meds:     Current Outpatient Medications   Medication Sig Dispense Refill    atorvastatin (LIPITOR) 40 MG tablet Take 40 mg by mouth daily      Calcium Carbonate-Vitamin D 250-3.125 MG-MCG TABS Calcium Carb-Magnesium-Vit D2:  Take 1 cap by mouth.      lurasidone (LATUDA) 40 MG TABS tablet Take 40 mg by mouth daily      sertraline (ZOLOFT) 100 MG tablet Take 200 mg by mouth daily      tirzepatide (MOUNJARO) 2.5 MG/0.5ML pen Inject 2.5 mg Subcutaneous every 7 days      topiramate (TOPAMAX) 25 MG tablet Take 25 mg by mouth 2 times daily    "      Allergies:   Morphine and related    Review of Systems:     Positive for shortness of breath with activity.  12 point review of systems otherwise negative     Please refer above for cardiac ROS details.       Objective:      Physical Exam  97.1 kg (214 lb)  1.655 m (5' 5.16\")  Body mass index is 35.44 kg/m .  BP 98/68 (BP Location: Right arm, Patient Position: Sitting, Cuff Size: Adult Large)   Pulse 72   Resp 16   Ht 1.655 m (5' 5.16\")   Wt 97.1 kg (214 lb)   BMI 35.44 kg/m          General Appearance : Awake, Alert, No acute distress  HEENT: No Scleral icterus; the mucous membranes were pink and moist.  Conjunctivae not injected  Neck:  No cervical bruits, jugular venous distention, or thyromegaly   Chest: The spine was straight. Chest wall symmetric  Lungs: Respirations unlabored; the lungs are clear to auscultation.  No wheezing   Cardiovascular:   Auscultation reveals normal first and second heart sounds with no murmurs, rubs, or gallops.  Carotid, radial, and dorsalis pedal pulses are intact and symmetric.    Abdomen: Obese.  No organomegaly, masses, bruits, or tenderness. Bowels sounds are present  Extremities: No edema  Skin: No xanthelasma. Warm, Dry.  Musculoskeletal: No tenderness.  Neurologic: Alert and oriented ×3. Speech is fluent.      EKG (personally reviewed):  Today: Sinus bradycardia 59 bpm.  Low voltage QRS.  Left axis deviation.  Nonspecific T wave changes.    Cardiac Imaging Studies:  CT coronary calcium screening at [facility] 6/2022:  2. Right dominant coronary arterial system.   3. Total Agatston calcium score 145 in LAD; 96th percentile.     Lab Review   Lab Results   Component Value Date     02/01/2024    CO2 22 02/01/2024    CO2 24 05/02/2022    BUN 12.3 02/01/2024    BUN 8 05/02/2022     Lab Results   Component Value Date    WBC 8.0 02/01/2024    HGB 14.5 02/01/2024    HCT 43.2 02/01/2024    MCV 96 02/01/2024     02/01/2024     Lab Results   Component Value Date    " "CHOL 212 02/01/2024    TRIG 130 02/01/2024    HDL 81 02/01/2024     No results found for: \"TROPONINI\"  No results found for: \"BNP\"  Lab Results   Component Value Date    TSH 3.56 02/01/2024    TSH 1.90 12/05/2018       Trung Fletcher MD, MD [facility]      This note created using Dragon voice recognition software. Sound alike errors may have escaped editing.    "

## 2024-03-21 NOTE — PATIENT INSTRUCTIONS
Continue your current dose of atorvastatin.  Work on getting back to the diet and exercise regimen you are on 1 year ago to bring your LDL back down to where it was at that time  Congratulations on your regular exercise regimen.  There is no substitute.  You fall in to a low risk category currently, despite the increase in your LDL compared to a year ago.  Nonpharmacologic treatment appears more appropriate at this time.

## 2024-03-25 LAB
ATRIAL RATE - MUSE: 59 BPM
DIASTOLIC BLOOD PRESSURE - MUSE: NORMAL MMHG
INTERPRETATION ECG - MUSE: NORMAL
P AXIS - MUSE: 27 DEGREES
PR INTERVAL - MUSE: 170 MS
QRS DURATION - MUSE: 98 MS
QT - MUSE: 448 MS
QTC - MUSE: 443 MS
R AXIS - MUSE: -47 DEGREES
SYSTOLIC BLOOD PRESSURE - MUSE: NORMAL MMHG
T AXIS - MUSE: 48 DEGREES
VENTRICULAR RATE- MUSE: 59 BPM

## 2024-06-23 ASSESSMENT — SLEEP AND FATIGUE QUESTIONNAIRES
HOW LIKELY ARE YOU TO NOD OFF OR FALL ASLEEP IN A CAR, WHILE STOPPED FOR A FEW MINUTES IN TRAFFIC: WOULD NEVER DOZE
HOW LIKELY ARE YOU TO NOD OFF OR FALL ASLEEP WHILE SITTING AND TALKING TO SOMEONE: WOULD NEVER DOZE
HOW LIKELY ARE YOU TO NOD OFF OR FALL ASLEEP WHILE WATCHING TV: SLIGHT CHANCE OF DOZING
HOW LIKELY ARE YOU TO NOD OFF OR FALL ASLEEP WHEN YOU ARE A PASSENGER IN A CAR FOR AN HOUR WITHOUT A BREAK: MODERATE CHANCE OF DOZING
HOW LIKELY ARE YOU TO NOD OFF OR FALL ASLEEP WHILE SITTING QUIETLY AFTER LUNCH WITHOUT ALCOHOL: WOULD NEVER DOZE
HOW LIKELY ARE YOU TO NOD OFF OR FALL ASLEEP WHILE LYING DOWN TO REST IN THE AFTERNOON WHEN CIRCUMSTANCES PERMIT: MODERATE CHANCE OF DOZING
HOW LIKELY ARE YOU TO NOD OFF OR FALL ASLEEP WHILE SITTING INACTIVE IN A PUBLIC PLACE: WOULD NEVER DOZE
HOW LIKELY ARE YOU TO NOD OFF OR FALL ASLEEP WHILE SITTING AND READING: SLIGHT CHANCE OF DOZING

## 2024-06-27 ENCOUNTER — VIRTUAL VISIT (OUTPATIENT)
Dept: SURGERY | Facility: CLINIC | Age: 59
End: 2024-06-27
Payer: COMMERCIAL

## 2024-06-27 ENCOUNTER — OFFICE VISIT (OUTPATIENT)
Dept: FAMILY MEDICINE | Facility: CLINIC | Age: 59
End: 2024-06-27
Payer: COMMERCIAL

## 2024-06-27 VITALS
BODY MASS INDEX: 32.04 KG/M2 | WEIGHT: 192.3 LBS | HEART RATE: 63 BPM | TEMPERATURE: 97.9 F | HEIGHT: 65 IN | SYSTOLIC BLOOD PRESSURE: 115 MMHG | DIASTOLIC BLOOD PRESSURE: 71 MMHG | OXYGEN SATURATION: 97 % | RESPIRATION RATE: 16 BRPM

## 2024-06-27 DIAGNOSIS — E78.2 MIXED HYPERLIPIDEMIA: ICD-10-CM

## 2024-06-27 DIAGNOSIS — R73.03 PREDIABETES: ICD-10-CM

## 2024-06-27 DIAGNOSIS — F50.819 BINGE EATING DISORDER: ICD-10-CM

## 2024-06-27 DIAGNOSIS — E66.9 OBESITY (BMI 30-39.9): Primary | ICD-10-CM

## 2024-06-27 DIAGNOSIS — E66.811 CLASS 1 OBESITY DUE TO EXCESS CALORIES WITH SERIOUS COMORBIDITY AND BODY MASS INDEX (BMI) OF 32.0 TO 32.9 IN ADULT: ICD-10-CM

## 2024-06-27 DIAGNOSIS — E66.09 CLASS 1 OBESITY DUE TO EXCESS CALORIES WITH SERIOUS COMORBIDITY AND BODY MASS INDEX (BMI) OF 32.0 TO 32.9 IN ADULT: ICD-10-CM

## 2024-06-27 PROCEDURE — 99215 OFFICE O/P EST HI 40 MIN: CPT | Performed by: FAMILY MEDICINE

## 2024-06-27 PROCEDURE — 97803 MED NUTRITION INDIV SUBSEQ: CPT | Mod: 95

## 2024-06-27 PROCEDURE — 99417 PROLNG OP E/M EACH 15 MIN: CPT | Performed by: FAMILY MEDICINE

## 2024-06-27 ASSESSMENT — PATIENT HEALTH QUESTIONNAIRE - PHQ9
SUM OF ALL RESPONSES TO PHQ QUESTIONS 1-9: 2
10. IF YOU CHECKED OFF ANY PROBLEMS, HOW DIFFICULT HAVE THESE PROBLEMS MADE IT FOR YOU TO DO YOUR WORK, TAKE CARE OF THINGS AT HOME, OR GET ALONG WITH OTHER PEOPLE: NOT DIFFICULT AT ALL
SUM OF ALL RESPONSES TO PHQ QUESTIONS 1-9: 2

## 2024-06-27 NOTE — ASSESSMENT & PLAN NOTE
58 year old year old female in clinic today to discuss treatment of the following conditions through diet and lifestyle modification and weight loss:  1. Class 1 obesity due to excess calories with serious comorbidity and body mass index (BMI) of 32.0 to 32.9 in adult    2. Mixed hyperlipidemia    3. Prediabetes    4. Binge eating disorder      Intake: 58-year-old woman who meets criteria for metabolic syndrome presenting for discussion of metabolic health.  I suspect there is some iatrogenic weight gain with the various mental health medicines has been on over the years as well as Latuda which is currently being titrated under the supervision of her psychiatrist.  She has been on compounded tirzepatide through an online pharmacy for the past few months with approximately 25 pounds of weight loss.  She still struggles at times with overeating despite being on 10 mg of this medication.  She is actually struggling with nausea as well.  We had a lengthy discussion regarding different strategies for weight loss.  Her approach until now has been a focus on calories which has allowed her to continue eating ice cream.  I suspect that this is in part driving some of her cravings.  I suggested a focus on protein and vegetables as well as fruit.  She is not a natural vegetable eater.  The patient describes experiencing a number of episodes of binging per week throughout her life.  I think she does meet criteria for binge eating disorder.  For this reason I think she might benefit from Vyvanse, in particular as it may become cost prohibitive to stay on compounded tirzepatide.  She will check with her psychiatrist to see if this is compatible with her current thoughts and treatment plan.  If she does get clearance from psychiatry I would add this medicine with the hope of the decreasing binging behavior.  If she does start a medicine that I am supervising I would like to see her back in clinic in approximately 3 months.   Lastly, on the outside chance that her insurance is willing to cover Zepbound, this medicine was sent to the pharmacy.  She has no personal/family history of thyroid cancer.  No personal history of pancreatitis.  She was undecided on participation in [facility] 24 wk program.

## 2024-06-27 NOTE — PROGRESS NOTES
Angelina Sim is a 58 year old who is being evaluated via a billable video visit.          Medical Weight Loss Initial Diet Evaluation  Assessment:  This patient was referred by Dr. Van for MNT as treatment for Obesity.   Angelina is presenting today for a new weight management nutrition consultation. Pt has had an initial appointment with Dr. Aguilar.    Weight loss medication:  Zepbound .     Personal Goals: weight loss and nausea relief      Anthropometrics:    Pt's weight is 0 lbs 0 oz  Initial weight: 192 lbs  Weight change: n/a  BMI: There is no height or weight on file to calculate BMI.   Ideal body weight: 57 kg (125 lb 10.6 oz)  Adjusted ideal body weight: 69.1 kg (152 lb 5.1 oz)  Estimated RMR (Richy-St Ch equation):  1455 kcals x 1.2 (sedentary) = 1745 kcals (for weight maintenance)  1455 kcals x 1.3 (light active) = 2000 kcals (for weight maintenance)    Recommended Protein Intake: 60-80 grams of protein/day    Medical History:  Patient Active Problem List   Diagnosis    Atypical ductal hyperplasia of breast    Hypertriglyceridemia    Cough headache    Melanoma of skin (H)    Elevated coronary artery calcium score    Thyroid nodule    Anxiety    Mild episode of recurrent major depressive disorder (H24)    Prediabetes    Abnormal Pap smear of cervix    Mixed hyperlipidemia    Class 1 obesity due to excess calories with serious comorbidity and body mass index (BMI) of 32.0 to 32.9 in adult          Nutrition History:   Food allergies/intolerances/cultural or religous food customs: No     Weight loss history: Been taking Zepbound for a while, down 22 lbs, struggling with nausea. Struggles with comfort eating. Hx of binging.  Hx with weight regain after losing. Hx of using WW, Atkins diet. Felt the only way to lose weight was to starve herself.   - recognizes huger   - vegetables are hard to eat d/t not being appetizing  - does the grocery shopping and prepares meals at home      Vitamins/Mineral Supplementation: Ca, Mg, Vit D     Dietary Recall:  Breakfast: skips   Lunch: peanut butter toast   Dinner: a protein (chicken) and a veggie   Typical Snacks: Ice cream   Overnight eating: No  Eating out: 1-2x per week     Beverages:  tea with heavy cream  Water 60oz       Exercise:   Walks often, 10k steps per day. Starting to do some weight lifting.     Nutrition Diagnosis (PES statement):     Obesity related to excessive energy intake as evidence by patients subjective reports and BMI of 32       Nutrition Intervention  Food and/or Nutrient Delivery   Placed emphasis on importance of developing a healthy meal routine, aiming for 3 meals a day and no snacks.  Discussed using a protein supplement as a meal replacement.  Nutrition Education   Discussed with patient how to build a meal: the importance of including a lean/low fat protein at each meal, include a source of vegetables at a minimum of lunch and dinner and limiting carbohydrate intake to 25% per meal.  Educated on sources of lean protein, portion sizes, the amount of grams found in each source. Recommend patient to aim for 20-30g protein at each meal.  Discussed the importance of adequate hydration, with emphasis on drinking 64oz of water or zero calorie beverages per day.  Nutrition Counseling   Encouraged importance of developing routine exercise for health benefits and weight loss.      Goals established by patient:   Target 1,200 kcal with  minimum 60 g protein per day  Pair breakfast with lean protein and fruit servings   Choose fat free/low fat dairy products       Handouts provided:  ANDRES intro  Myplate    Assessment/Plan:    Pt will follow up in TBD month(s) with bariatrician and 5 month(s) with dietitian.         Video-Visit Details    Type of service:  Video Visit    Video Start Time (time video started): 1:30 PM    Video End Time (time video stopped): 1:55 PM    Originating Location (pt. Location): Home      Distant Location  (provider location):  Off-site    Mode of Communication:  Video Conference via [facility]    Physician has received verbal consent for a Video Visit from the patient? Yes      Suly Pelayo RD

## 2024-06-27 NOTE — PATIENT INSTRUCTIONS
Eat Better ? Move More ? Live Well    Eat 3 nutrient-rich meals each day     Don't skip meals--it will cause you to overeat later in the day!     Eating fiber (vegetables/fruits/whole grains) and protein with meals helps you stay full longer     Choose foods with less than 10 grams of sugar and 5 grams of fat per serving to prevent excess calories and weight re-gain   Eat around the same times each day to develop a routine eating schedule    Avoid snacking unless physically hungry.   Planned snacks: 1-2 times per day and no more than 150 calories    Eat protein first    Protein helps with healing, maintaining adequate muscle mass, reducing hunger and optimizing nutritional status    Aim for 60-80grams of protein per day   Fill up on Fiber    Fiber comes from plants--fruits, veggies, whole grains, nuts/seeds and beans    Fiber is low in calories, high in phytonutrients and helps you stay full longer    Aim for 25-35 grams per day by eating fiber with meals and snacks  Eat [id number]    Take 20-30 minutes to eat each meal by taking small bites, chewing foods to applesauce consistency or 20-30 times before you swallow    Eating foods too fast can delay satiety/fullness signals and increase overeating   Slow down your eating by using toddler utensils, putting your fork/spoon down between bites and not watching TV or emailing during meals!   Keep a Journal          Writing down what you eat, how you feel and when you are active helps you identify new changes to work on from week to week          Look for ways to cut 100 calories from your current diet 2-3 times per day  Drink 64 ounces of 0-Calorie drinks between meals    Water    Zero calorie Propel  or Vitamin Water      SoBe [place]  Zero Calories    Crystal Light , Sugar-Free Kurt-Aid , and other sugar-free lemonade or flavored graham    Keep Caffeine to less than 300mg per day ie: 3-6oz cups coffee     Work up to 45-60 minutes of physical activity most days of  the week    Helps with losing weight and prevent regaining those extra pounds!     Do a combo of cardio (walking/water exercises) and strength training (lifting weights/Vinyasa yoga)    Avoid Mindless Eating    Be present when you eat--take note of the smell, taste and quality of your food    Make a list of alternative activities you could do to prevent eating out of boredom/stress  Go for a walk, call a friend, chew gum, paint your nails, re-organize the garage, etc      LEAN PROTEIN SOURCES    Protein Source Portion Calories Grams of Protein                           Nonfat, plain Greek yogurt    (10 grams sugar or less) 3/4 cup (6 oz)  12-17   Light Yogurt (10 grams sugar or less) 3/4 cup (6 oz)  6-8   Protein Shake 1 shake 110-180 15-30   Skim/1% Milk or lactose-free milk 1 cup ( 8 oz)  8   Plain or light, flavored soymilk 1 cup  7-8   Plain or light, hemp milk 1 cup 110 6   Fat Free or 1% Cottage Cheese 1/2 cup 90 15   Part skim ricotta cheese 1/2 cup 100 14   Part skim or reduced fat cheese slices 1/4cup, 3 dice 65-80 8     Mozzarella String Cheese 1 80 8   Canned tuna, chicken, crab or salmon  (canned in water)  1/2 cup 100 15-20   White fish (broiled, grilled, baked) 3 ounces 100 21   [place]/Tuna (broiled, grilled, baked) 3 ounces 150-180 21   Shrimp, Scallops, Lobster, Crab 3 ounces 100 21   Pork loin, Pork Tenderloin 3 ounces 150 21   Boneless, skinless chicken /turkey breast                          (broiled, grilled, baked) 3 ounces 120 21   [place], [place], [place], and Venison 3 ounces 120 21   Lean cuts of red meat and pork (sirloin,   round, tenderloin, flank, ground 93%-96%) 3 ounces 170 21   Lean or Extra Lean Ground Turkey 1/2 cup 150 20   90-95% Lean [place] Burger 1 jose l 140-180 21   Low-fat casserole with lean meat 3/4 cup 200 17   Luncheon Meats                                                        (turkey, lean ham, roast beef, chicken) 3 ounces 100 21   Egg (boiled,  poached, scrambled) 1 Egg 60 7   Egg Substitute 1/2 cup 70 10   Nuts (limit to 1 serving per day)  3 Tbsp. 150 7   Nut [place] (peanut, almond)  Limit to 1 serving or less daily 1 Tbsp. 90 4   Soy Burger (varies) 1  10-15   Edamame  1/2 cup ~95 9   Garbanzo, Black, Andujar Beans 1/2 cup 110 7   Refried Beans 1/2 cup 100 7   Kidney and Lima beans 1/2 cup 110 7   Tempeh 3 oz 175 18   Vegan crumbles 1/2 cup 100 14   Tofu 1/2 cup 110 14   Chili (beans and extra lean beef or turkey) 1 cup 200 23   Lentil Stew/Soup 1 cup 150 12   Black Bean Soup 1 cup 175 12     Carbohydrates  Carbohydrates fuel your body with glucose (sugar)--the energy your body needs so you can do your daily activities.  Carbohydrates offer an immediate source of energy for your body. They provide the fuel for your muscles and organs, such as your brain.     Types of Carbohydrates     Complex Carbohydrates are higher in fiber and keep you feeling full longer--helping you eat less.   These are found in nearly all plant-based foods and usually take longer for the body to digest.  They are most commonly found in whole-wheat bread, whole-grain pasta, brown rice, starchy vegetables,   and fruits  Refined Carbohydrates require almost NO WORK for digestion and break down into glucose more quickly   than complex carbohydrates. Refined carbohydrates are usually high in calories and low in nutrients and fiber--  eating more of these can lead to weight gain.  Thinking about eliminating carbohydrates???  If you do not eat enough carbohydrates, the following can occur:  Fatigue  Muscle cramps  Poor mental function  Fatigue easily results from deprivation of carbohydrates, which is seen in people who fast, possibly interfering with activities of daily living.      Thinking about eliminating carbohydrates???  If you do not eat enough carbohydrates, the following can occur:  Fatigue  Muscle cramps  Poor mental function  Fatigue easily results from deprivation of  carbohydrates, which is seen in people who fast, possibly interfering with activities of daily living    Carbohydrates are your body's first choice for fuel. If given a choice of several types of foods simultaneously, your body will use the energy from carbohydrates first.    What foods contain carbohydrates?  Choose the following foods containing carbohydrates (the BEST ones to eat):   Fruit-fresh, frozen, canned in their own juices  Whole grains:  Whole-wheat breads  Brown rice  Oatmeal  Whole-grain cereals  Other starchy foods containing a minimum of 3 grams (g) fiber/100 calories  The ingredient label should list whole wheat or whole grain as one of the first ingredients (bulgur, quinoa, buckwheat, millet, spelt, faro, kasha)  Milk or yogurt (a natural source of carbohydrates):  Low-fat milk  Fat-free milk  Yogurt   Beans or legumes     Starchy vegetables, raw or frozen:  Potatoes  Peas  Corn    AVOID or limit the following foods containing carbohydrates:  Refined sugars, such as in:  Candy  Desserts-ice cream, cakes, pies, brownies, frozen yogurt, sherbet/sorbet  Cookies  White flour: bread/pasta/crackers/rice/tortillas  Sugary snacks: sweetened cereal, granola bars, cereal bars, donuts, muffins, bagels  Sugary Drinks:  Fruit Juice, Smoothies  Sports Drinks  Regular Soda    What are typical serving sizes or portions?  The following are some serving and portion sizes for foods containing carbohydrates:  One medium piece of fruit, about 4?5 ounces (oz) (-tennis ball)  1 cup (C) berries or melon    C canned fruit    C juice (100% vegetable)    C starchy vegetables, cooked or chopped  One slice whole-grain bread  ? C brown rice, quinoa, buckwheat, millet, spelt, faro, kasha    C oatmeal (dry)    C bulgur  One small tortilla (less than 6inch diameter)    C wheat germ  1 oz pretzels     C flaked cereal        Calorie-Controlled Sample Meal Plans    Examples of small healthy meals    Breakfast   Omelet made with   cup  to   cup egg substitute or 2 eggs    cup chopped vegetables  1-2 tbsp. of light cheese     cup salsa  Medium banana    1 cup non-fat plain, Greek yogurt mixed with 1 cup berries and 1-2 Tbsp nuts or cereal   -3/4 cup skim or 1% cottage cheese    cup unsweetened whole-grain cereal  1/2 cup of fresh strawberries  Whole-wheat English muffin or mini bagel, 1 scrambled egg and 1 slice Swiss cheese   Small orange  Protein Bar or Shake (15-30 grams protein and 15-25 grams Carbohydrates)    cup cottage cheese, low-fat    cup fresh fruit    11 ounces of Slim Fast Low Carb (only), Karley's Advantage, EAS Carb Control    Lunch/Dinner  2-3 slices roasted turkey breast  1 tbsp. of fat free mayonnaise  2 slices of  whole-wheat bread, Medium apple  10 baby carrots with 1 tbsp. of low-fat dip     cup water packed tuna or chicken  1 tablespoons of low-fat mayonnaise  1-2 tbsp. dill relish  1 serving of whole-grain crackers  1 cup of strawberries   6 inch turkey sub sandwich with light mayonnaise,   cup cottage cheese                                                                                                                                                      Black bean and low-fat cheese on a whole wheat tortilla with salsa and light sour cream  Grilled chicken sandwich  Tossed salad with light dressing    Baked potato with 3/4 cup of extra lean ground beef, light shredded cheese and salsa  Fresh fruit                                                 Chicken chunks with lettuce and vegetables stuffed in dawood  Steamed broccoli                                                 3 oz boneless/skinless chicken breast  1/2 cup brown rice with stir-fried vegetables    grapefruit  3 ounces of salmon, trout, or tuna  1 cup of steamed asparagus  1 small slice whole grain Italian bread  Broiled white or pink fish  3/4 cup whole wheat pasta with tomatoes  3/4 cup of roasted red peppers  3 oz. of extra lean (93/7) hamburger on a Arnold's  [place] Thins  Tossed salad with light dressing       Black bean or Tuscan bean soup with grated mozzarella cheese    of a flour tortilla    3 ounces of grilled pork loin with 1 tbsp. of low-sugar barbeque sauce, 1 cup of green beans seasoned with pepper  Small dinner roll or   cup of grapefruit sections    1-2 cups of torn areli    cup of garbanzo beans or diced skinless chicken breast  5-6 cherry tomatoes  1  tbsp. of crumbled feta cheese  1 tbsp. of roasted soy nuts  1 tsp. of olive oil and 2-3 Tbsp. of balsamic or red wine vinegar  Small whole-wheat dinner roll or   cup of cut up pineapple

## 2024-06-27 NOTE — PROGRESS NOTES
"    New Medical Weight Management Consult    PATIENT:  Angelina Sim  MRN:         [id number]  :         1965  ARMINDA:         2024    Dear Bryanna AGUIRRE CNP,    I had the pleasure of seeing your patient, Angelina Sim. Full intake/assessment was done to determine barriers to weight loss success and develop a treatment plan. Angelina Sim is a 58 year old female interested in treatment of medical problems associated with excess weight. She has a height of 5' 5\", a weight of 192 lbs 4.8 oz, and the calculated Body mass index is 32 kg/m .    ASSESSMENT/PLAN:  Class 1 obesity due to excess calories with serious comorbidity and body mass index (BMI) of 32.0 to 32.9 in adult  58 year old year old female in clinic today to discuss treatment of the following conditions through diet and lifestyle modification and weight loss:  1. Class 1 obesity due to excess calories with serious comorbidity and body mass index (BMI) of 32.0 to 32.9 in adult    2. Mixed hyperlipidemia    3. Prediabetes    4. Binge eating disorder      Intake: 58-year-old woman who meets criteria for metabolic syndrome presenting for discussion of metabolic health.  I suspect there is some iatrogenic weight gain with the various mental health medicines has been on over the years as well as Latuda which is currently being titrated under the supervision of her psychiatrist.  She has been on compounded tirzepatide through an online pharmacy for the past few months with approximately 25 pounds of weight loss.  She still struggles at times with overeating despite being on 10 mg of this medication.  She is actually struggling with nausea as well.  We had a lengthy discussion regarding different strategies for weight loss.  Her approach until now has been a focus on calories which has allowed her to continue eating ice cream.  I suspect that this is in part driving some of her cravings.  I suggested a focus on protein and vegetables " "as well as fruit.  She is not a natural vegetable eater.  The patient describes experiencing a number of episodes of binging per week throughout her life.  I think she does meet criteria for binge eating disorder.  For this reason I think she might benefit from Vyvanse, in particular as it may become cost prohibitive to stay on compounded tirzepatide.  She will check with her psychiatrist to see if this is compatible with her current thoughts and treatment plan.  If she does get clearance from psychiatry I would add this medicine with the hope of the decreasing binging behavior.  If she does start a medicine that I am supervising I would like to see her back in clinic in approximately 3 months.  Lastly, on the outside chance that her insurance is willing to cover Zepbound, this medicine was sent to the pharmacy.  She has no personal/family history of thyroid cancer.  No personal history of pancreatitis.    FOLLOW-UP:   12 weeks.    SUBJECTIVE:     She has the following co-morbidities:        6/23/2024    11:25 AM   --   I have the following health issues associated with obesity Pre-Diabetes    High Cholesterol   I have the following symptoms associated with obesity Knee Pain    Depression    Fatigue     Narrative History:    - she has been taking tirzepatide.  She is getting it through [facility]. She is up to 10 mg. She has lost weight (~22 lbs). Appetite is down.  She has been struggling with nausea which is severe.     - reji, mental health    - weight gain starting at age 10.  \"Comfort eater.\" Example: ice cream.    - history of binging.  Row of oreos.  Variable frequency.     - she has had less interest in meat based protein.   - in the past she has tried to prioritize protein and veggies.  Breakfast: tea and cream. Lunch: leftovers or cereal (some kind of flakes).     - she walks frequently.  Active job.          No data to display                  6/23/2024    11:25 AM   Referring Provider   Please name the " "provider who referred you to Medical Weight Management  If you do not know, please answer \"I Don't Know\" I looked you up         6/23/2024    11:25 AM   Weight History   How concerned are you about your weight? Very Concerned   I became overweight As an Adult   The following factors have contributed to my weight gain Mental Health Issues    Eating Wrong Types of Food    Eating Too Much    Lack of Exercise    Stress    Other   Please list the other factors covid -   I have tried the following methods to lose weight Watching Portions or Calories    Exercise    Weight Watchers    Atkins-type Diet (Low Carb/High Protein)    Medications    Fasting   Please list the medications you have tried Tirzepatide 10 mg.   My highest weight since age 18 was 220   I have the following family history of obesity/being overweight One or more of my siblings are overweight   How has your weight changed over the last year? Lost         6/23/2024    11:25 AM   Diet Recall Review with Patient   If you do eat breakfast, what types of food do you eat? I have heavy cream in my tea   If you do eat lunch, what types of food do you typically eat? peanut butter toast   If you do eat supper, what types of food do you typically eat? a protein and a veggie   How many glasses of juice do you drink in a typical day? 0   How many of glasses of milk do you drink in a typical day? 0   How many 8oz glasses of sugar containing drinks such as Kurt-Aid/sweet tea do you drink in a day? 0   How many cans/bottles of sugar pop/soda/tea/sports drinks do you drink in a day? 0   How many cans/bottles of diet pop/soda/tea or sports drink do you drink in a day? 0   How often do you have a drink of alcohol? Never         6/23/2024    11:25 AM   Eating Habits   Generally, my meals include foods like these bread, pasta, rice, potatoes, corn, crackers, sweet dessert, pop, or juice Almost Everyday   Generally, my meals include foods like these fried meats, brats, burgers, " french fries, pizza, cheese, chips, or ice cream A Few Times a Week   Eat fast food (like McDonalds, Burger Adin, Taco Bell) Less Than Weekly   Eat at a buffet or sit-down restaurant Less Than Weekly   Eat most of my meals in front of the TV or computer Once a Week   Often skip meals, eat at random times, have no regular eating times A Few Times a Week   Rarely sit down for a meal but snack or graze throughout Once a Week   Eat extra snacks between meals Never   Eat most of my food at the end of the day Never   Eat in the middle of the night or wake up at night to eat Never   Eat extra snacks to prevent or correct low blood sugar Never   Eat to prevent acid reflux or stomach pain Never   Worry about not having enough food to eat Never   I eat when I am depressed Less Than Weekly   I eat when I am stressed Less Than Weekly   I eat when I am bored Less Than Weekly   I eat when I am anxious Less Than Weekly   I eat when I am happy or as a reward Less Than Weekly   I feel hungry all the time even if I just have eaten Never   Feeling full is important to me Never   I finish all the food on my plate even if I am already full Never   I can't resist eating delicious food or walk past the good food/smell Never   I eat/snack without noticing that I am eating Never   I eat when I am preparing the meal A Few Times a Week   I eat more than usual when I see others eating Once a Week   I have trouble not eating sweets, ice cream, cookies, or chips if they are around the house Once a Week   I think about food all day Once a Week   What foods, if any, do you crave? Sweets/Candy/Chocolate   Please list any other foods you crave? Chocolate         6/23/2024    11:25 AM   Amount of Food   I feel out of control when eating Monthly   I eat a large amount of food, like a loaf of bread, a box of cookies, a pint/quart of ice cream, all at once Monthly   I eat a large amount of food even when I am not hungry Monthly   I eat rapidly Everyday    I eat alone because I feel embarrassed and do not want others to see how much I have eaten Weekly   I eat until I am uncomfortably full Weekly   I feel bad, disgusted, or guilty after I overeat Weekly         6/23/2024    11:25 AM   Activity/Exercise History   How much of a typical 12 hour day do you spend sitting? Half the Day   How much of a typical 12 hour day do you spend lying down? Less Than Half the Day   How much of a typical day do you spend walking/standing? Less Than Half the Day   How many hours (not including work) do you spend on the TV/Video Games/Computer/Tablet/Phone? 2-3 Hours   How many times a week are you active for the purpose of exercise? 6-7 Times a Week   What keeps you from being more active? Other   How many total minutes do you spend doing some activity for the purpose of exercising when you exercise? More Than 30 Minutes     PAST MEDICAL HISTORY:  Past Medical History:   Diagnosis Date    Depressive disorder 2018 6/23/2024    11:25 AM   Work/Social History Reviewed With Patient   My employment status is Full-Time   My job is paraprofessional   How much of your job is spent on the computer or phone? Less Than 50%   How many hours do you spend commuting to work daily? 2-3   What is your marital status? /In a Relationship   If in a relationship, is your significant other overweight? No   If you have children, are they overweight? No   Who do you live with? my  and my son   Who does the food shopping? Both my  and I         6/23/2024    11:25 AM   Mental Health History Reviewed With Patient   Have you ever been physically or sexually abused? No   How often in the past 2 weeks have you felt little interest or pleasure in doing things? Not at all   Over the past 2 weeks how often have you felt down, depressed, or hopeless? Not at all         6/23/2024    11:25 AM   Sleep History Reviewed With Patient   How many hours do you sleep at night? 8     MEDICATIONS:    Current Outpatient Medications   Medication Sig Dispense Refill    atorvastatin (LIPITOR) 40 MG tablet Take 40 mg by mouth daily      Calcium Carbonate-Vitamin D 250-3.125 MG-MCG TABS Calcium Carb-Magnesium-Vit D2:  Take 1 cap by mouth.      lurasidone (LATUDA) 40 MG TABS tablet Take 80 mg by mouth daily      sertraline (ZOLOFT) 100 MG tablet Take 200 mg by mouth daily      tirzepatide (MOUNJARO) 2.5 MG/0.5ML pen Inject 10 mg Subcutaneous every 7 days      tirzepatide-Weight Management (ZEPBOUND) 10 MG/0.5ML prefilled pen Inject 0.5 mLs (10 mg) Subcutaneous every 7 days 2 mL 0    topiramate (TOPAMAX) 25 MG tablet Take 25 mg by mouth 2 times daily       ALLERGIES:   Allergies   Allergen Reactions    Morphine And Codeine Unknown     nausea    Upset stomach     PHYSICAL EXAM:  Physical Exam  Nursing note reviewed.   Constitutional:       General: She is not in acute distress.     Appearance: Normal appearance. She is not ill-appearing.   HENT:      Head: Normocephalic and atraumatic.   Eyes:      Extraocular Movements: Extraocular movements intact.      Conjunctiva/sclera: Conjunctivae normal.   Pulmonary:      Effort: Pulmonary effort is normal.   Neurological:      Mental Status: She is alert and oriented to person, place, and time.   Psychiatric:         Attention and Perception: Attention normal.         Mood and Affect: Mood normal.         Speech: Speech normal.         Thought Content: Thought content normal.       68 minutes spent on the date of the encounter doing chart review, history and exam, documentation and further activities per the note    This note has been dictated using voice recognition software. Any grammatical or context distortions are unintentional and inherent to the software    Sincerely,    Jacobo Aguilar MD  Diplomate - American Board of Obesity Medicine  Diplomate - American Board of Family Medicine  [facility] - Comprehensive Weight Management

## 2024-06-27 NOTE — LETTER
6/27/2024      Angelina Sim  [address] [place] [address] [facility] [address]      Dear Colleague,    Thank you for referring your patient, Angelina Sim, to the [facility] [place]. Please see a copy of my visit note below.    Angelina Sim is a 58 year old who is being evaluated via a billable video visit.          Medical Weight Loss Initial Diet Evaluation  Assessment:  This patient was referred by Dr. Van for MNT as treatment for Obesity.   Angelina is presenting today for a new weight management nutrition consultation. Pt has had an initial appointment with Dr. Aguilar.    Weight loss medication:  Zepbound .     Personal Goals: weight loss and nausea relief      Anthropometrics:    Pt's weight is 0 lbs 0 oz  Initial weight: 192 lbs  Weight change: n/a  BMI: There is no height or weight on file to calculate BMI.   Ideal body weight: 57 kg (125 lb 10.6 oz)  Adjusted ideal body weight: 69.1 kg (152 lb 5.1 oz)  Estimated RMR (Stanton-St Evansor equation):  1455 kcals x 1.2 (sedentary) = 1745 kcals (for weight maintenance)  1455 kcals x 1.3 (light active) = 2000 kcals (for weight maintenance)    Recommended Protein Intake: 60-80 grams of protein/day    Medical History:  Patient Active Problem List   Diagnosis     Atypical ductal hyperplasia of breast     Hypertriglyceridemia     Cough headache     Melanoma of skin (H)     Elevated coronary artery calcium score     Thyroid nodule     Anxiety     Mild episode of recurrent major depressive disorder (H24)     Prediabetes     Abnormal Pap smear of cervix     Mixed hyperlipidemia     Class 1 obesity due to excess calories with serious comorbidity and body mass index (BMI) of 32.0 to 32.9 in adult          Nutrition History:   Food allergies/intolerances/cultural or religous food customs: No     Weight loss history: Been taking Zepbound for a while, down 22 lbs, struggling with nausea. Struggles with comfort eating. Hx  of binging.  Hx with weight regain after losing. Hx of using WW, Atkins diet. Felt the only way to lose weight was to starve herself.   - recognizes huger   - vegetables are hard to eat d/t not being appetizing  - does the grocery shopping and prepares meals at home     Vitamins/Mineral Supplementation: Ca, Mg, Vit D     Dietary Recall:  Breakfast: skips   Lunch: peanut butter toast   Dinner: a protein (chicken) and a veggie   Typical Snacks: Ice cream   Overnight eating: No  Eating out: 1-2x per week     Beverages:  tea with heavy cream  Water 60oz       Exercise:   Walks often, 10k steps per day. Starting to do some weight lifting.     Nutrition Diagnosis (PES statement):     Obesity related to excessive energy intake as evidence by patients subjective reports and BMI of 32       Nutrition Intervention  Food and/or Nutrient Delivery   Placed emphasis on importance of developing a healthy meal routine, aiming for 3 meals a day and no snacks.  Discussed using a protein supplement as a meal replacement.  Nutrition Education   Discussed with patient how to build a meal: the importance of including a lean/low fat protein at each meal, include a source of vegetables at a minimum of lunch and dinner and limiting carbohydrate intake to 25% per meal.  Educated on sources of lean protein, portion sizes, the amount of grams found in each source. Recommend patient to aim for 20-30g protein at each meal.  Discussed the importance of adequate hydration, with emphasis on drinking 64oz of water or zero calorie beverages per day.  Nutrition Counseling   Encouraged importance of developing routine exercise for health benefits and weight loss.      Goals established by patient:   Target 1,200 kcal with  minimum 60 g protein per day  Pair breakfast with lean protein and fruit servings   Choose fat free/low fat dairy products       Handouts provided:  MWM intro  Myplate    Assessment/Plan:    Pt will follow up in TBD month(s) with  bariatrician and 5 month(s) with dietitian.         Video-Visit Details    Type of service:  Video Visit    Video Start Time (time video started): 1:30 PM    Video End Time (time video stopped): 1:55 PM    Originating Location (pt. Location): Home      Distant Location (provider location):  Off-site    Mode of Communication:  Video Conference via [facility]    Physician has received verbal consent for a Video Visit from the patient? Yes      Suly Pelayo RD         Again, thank you for allowing me to participate in the care of your patient.        Sincerely,        Suly Pelayo RD

## 2024-07-01 ENCOUNTER — MYC MEDICAL ADVICE (OUTPATIENT)
Dept: FAMILY MEDICINE | Facility: CLINIC | Age: 59
End: 2024-07-01
Payer: COMMERCIAL

## 2024-07-02 ENCOUNTER — TELEPHONE (OUTPATIENT)
Dept: FAMILY MEDICINE | Facility: CLINIC | Age: 59
End: 2024-07-02
Payer: COMMERCIAL

## 2024-07-02 NOTE — TELEPHONE ENCOUNTER
Prior Authorization Request      Who s requesting: Pharmacy     Pharmacy Name and Location: [place] [address]    Medication Name:   tirzepatide-Weight Management (ZEPBOUND) 10 MG/0.5ML prefilled pen     Insurance Plan: [place]     Insurance Member ID Number:  [id number]     CoverMyMeds Key: No    Informed patient that prior authorizations can take up to 10 business days for response:   NA    Okay to leave a detailed message: No         Route to pool:  [facility] [place] MED

## 2024-07-08 NOTE — TELEPHONE ENCOUNTER
Prior Authorization Approval    Medication: ZEPBOUND 10 MG/0.5ML SC SOAJ  Authorization Effective Date: 7/8/2024  Authorization Expiration Date: 3/8/2025  Approved Dose/Quantity:   Reference #:     Insurance Company: [employer] Non-Specialty PA's - Phone [phone] Fax [phone]  Expected CoPay: $    CoPay Card Available:      Financial Assistance Needed:   Which Pharmacy is filling the prescription: [facility][id number] - [address]  Pharmacy Notified: YES  Patient Notified: **Instructed pharmacy to notify patient when script is ready to /ship.**

## 2024-07-08 NOTE — TELEPHONE ENCOUNTER
PA Initiation    Medication: ZEPBOUND 10 MG/0.5ML SC SOAJ  Insurance Company: [employer]-Specialty PA's - Phone [phone] Fax [phone]  Ph[facility]illing the [id number] [place], [place] - [address] [place] [address]  Filling Pharmacy Phone: [phone]  Filling Pharmacy Fax: [phone]  Start Date: 7/8/2024

## 2024-08-05 ENCOUNTER — TRANSFERRED RECORDS (OUTPATIENT)
Dept: MULTI SPECIALTY CLINIC | Facility: CLINIC | Age: 59
End: 2024-08-05

## 2024-08-09 ENCOUNTER — HOSPITAL ENCOUNTER (OUTPATIENT)
Dept: MRI IMAGING | Facility: CLINIC | Age: 59
Discharge: HOME | End: 2024-08-09
Attending: NURSE PRACTITIONER | Admitting: NURSE PRACTITIONER
Payer: COMMERCIAL

## 2024-08-09 DIAGNOSIS — N60.99 ATYPICAL DUCTAL HYPERPLASIA OF BREAST: ICD-10-CM

## 2024-08-09 PROCEDURE — 258N000003 HC RX IP 258 OP 636: Performed by: NURSE PRACTITIONER

## 2024-08-09 PROCEDURE — A9585 GADOBUTROL INJECTION: HCPCS | Performed by: NURSE PRACTITIONER

## 2024-08-09 PROCEDURE — 77049 MRI BREAST C-+ W/CAD BI: CPT

## 2024-08-09 PROCEDURE — 255N000002 HC RX 255 OP 636: Performed by: NURSE PRACTITIONER

## 2024-08-09 RX ORDER — GADOBUTROL 604.72 MG/ML
8.5 INJECTION INTRAVENOUS ONCE
Status: COMPLETED | OUTPATIENT
Start: 2024-08-09 | End: 2024-08-09

## 2024-08-09 RX ADMIN — GADOBUTROL 8.5 ML: 604.72 INJECTION INTRAVENOUS at 12:51

## 2024-08-09 RX ADMIN — SODIUM CHLORIDE 50 ML: 9 INJECTION, SOLUTION INTRAVENOUS at 12:50

## 2024-08-12 ENCOUNTER — MYC REFILL (OUTPATIENT)
Dept: FAMILY MEDICINE | Facility: CLINIC | Age: 59
End: 2024-08-12
Payer: COMMERCIAL

## 2024-08-12 DIAGNOSIS — E78.1 HYPERTRIGLYCERIDEMIA: Primary | ICD-10-CM

## 2024-08-12 RX ORDER — ATORVASTATIN CALCIUM 40 MG/1
40 TABLET, FILM COATED ORAL DAILY
Qty: 90 TABLET | Refills: 1 | Status: SHIPPED | OUTPATIENT
Start: 2024-08-12

## 2024-08-15 ENCOUNTER — OFFICE VISIT (OUTPATIENT)
Dept: FAMILY MEDICINE | Facility: CLINIC | Age: 59
End: 2024-08-15
Payer: COMMERCIAL

## 2024-08-15 VITALS
RESPIRATION RATE: 11 BRPM | SYSTOLIC BLOOD PRESSURE: 95 MMHG | HEART RATE: 70 BPM | BODY MASS INDEX: 30.99 KG/M2 | WEIGHT: 186 LBS | HEIGHT: 65 IN | TEMPERATURE: 97.7 F | DIASTOLIC BLOOD PRESSURE: 64 MMHG | OXYGEN SATURATION: 97 %

## 2024-08-15 DIAGNOSIS — N60.99 ATYPICAL DUCTAL HYPERPLASIA OF BREAST: ICD-10-CM

## 2024-08-15 DIAGNOSIS — R68.82 DECREASED LIBIDO: Primary | ICD-10-CM

## 2024-08-15 DIAGNOSIS — F41.9 ANXIETY: ICD-10-CM

## 2024-08-15 DIAGNOSIS — F33.0 MILD EPISODE OF RECURRENT MAJOR DEPRESSIVE DISORDER (H): ICD-10-CM

## 2024-08-15 DIAGNOSIS — R32 URINARY INCONTINENCE, UNSPECIFIED TYPE: ICD-10-CM

## 2024-08-15 PROCEDURE — 99214 OFFICE O/P EST MOD 30 MIN: CPT | Performed by: NURSE PRACTITIONER

## 2024-08-15 ASSESSMENT — PAIN SCALES - GENERAL: PAINLEVEL: NO PAIN (0)

## 2024-08-15 NOTE — PROGRESS NOTES
"  Assessment & Plan     Decreased libido  Urinary incontinence, unspecified type  Patient describes decreased libido for many years as well as urinary incontinence.  We discussed that decreased libido is likely multifactorial.  I will place referral to pelvic floor PT.  The patient will be attending couples therapy with her .  She will also be following up with her mental health provider in regards to her current treatment plan and how this may be affecting libido as well.  The patient is advised to schedule follow-up visit with PCP in a couple of months to follow-up on these concerns and will notify us of any symptoms in the meantime.  - Physical Therapy  Referral    Atypical ductal hyperplasia of breast  Patient has annual mammograms and MRIs.  Reviewed most recent MRI results which appear stable.    Anxiety    Mild episode of recurrent major depressive disorder (H24)  Patient follows with mental health care provider outside of the system.  She is doing well on sertraline 100 mg daily.  She will be expressing desire to switch from Latuda due to development of tremor.        BMI  Estimated body mass index is 30.95 kg/m  as calculated from the following:    Height as of this encounter: 1.651 m (5' 5\").    Weight as of this encounter: 84.4 kg (186 lb).             Cipriano Alfaro is a 58 year old, presenting for the following health issues:  Sexual Problem (Would like to discuss low libido )      8/15/2024    10:22 AM   Additional Questions   Roomed by cali GOMEZ     The patient presents today to discuss concerns of decrease in libido.  She states that this has been going on for several years and that there may be a correlation with timing of menopause.  She is no longer having obvious symptoms of menopause such as hot flashes etc.  She has never used hormone replacement and is hesitant to due to atypical ductal hyperplasia of her breast.  She did recently undergo MRI and has been doing so " "yearly along with her mammograms.  These results were reviewed today and appear stable.  She states that decreased libido is mostly as lack and desire.  She is not having discomfort or pain with intercourse.  She does have some vaginal dryness and is using K-Y jelly for this.  She also reports some urinary incontinence intermittently.  No history of uterine prolapse etc.  She is starting couples therapy with her  and hopes that this will also help with the situation.    The patient is on sertraline 100 mg a day and has been for a while.  She was recently started on Latuda 40 mg a day this past March 2024.  She is managed by a psychiatrist outside of the system and plans to bring this up with the provider in case medications may be contributing to decreased libido.    Review of Systems - pertinent positives noted in HPI, otherwise ROS is negative.        Objective    BP 95/64 (BP Location: Left arm, Patient Position: Sitting, Cuff Size: Adult Regular)   Pulse 70   Temp 97.7  F (36.5  C) (Oral)   Resp 11   Ht 1.651 m (5' 5\")   Wt 84.4 kg (186 lb)   SpO2 97%   BMI 30.95 kg/m    Body mass index is 30.95 kg/m .  Physical Exam     General Appearance:  Alert, cooperative, no distress, appears stated age   Neurologic:  Psychiatric: Nonfocal.  Normal affect and mood.  States questions appropriately.  No obvious signs of depression or anxiety.             Signed Electronically by: CARLA Mayfield CNP      "

## 2024-08-26 DIAGNOSIS — F50.819 BINGE EATING DISORDER: ICD-10-CM

## 2024-08-26 DIAGNOSIS — R73.03 PREDIABETES: ICD-10-CM

## 2024-08-26 DIAGNOSIS — E66.09 CLASS 1 OBESITY DUE TO EXCESS CALORIES WITH SERIOUS COMORBIDITY AND BODY MASS INDEX (BMI) OF 32.0 TO 32.9 IN ADULT: ICD-10-CM

## 2024-08-26 DIAGNOSIS — E66.811 CLASS 1 OBESITY DUE TO EXCESS CALORIES WITH SERIOUS COMORBIDITY AND BODY MASS INDEX (BMI) OF 32.0 TO 32.9 IN ADULT: ICD-10-CM

## 2024-08-26 DIAGNOSIS — E78.2 MIXED HYPERLIPIDEMIA: ICD-10-CM

## 2024-08-28 RX ORDER — TIRZEPATIDE 10 MG/.5ML
INJECTION, SOLUTION SUBCUTANEOUS
Qty: 2 ML | Refills: 2 | Status: SHIPPED | OUTPATIENT
Start: 2024-08-28

## 2024-09-16 ENCOUNTER — TRANSFERRED RECORDS (OUTPATIENT)
Dept: HEALTH INFORMATION MANAGEMENT | Facility: CLINIC | Age: 59
End: 2024-09-16

## 2024-09-23 ENCOUNTER — THERAPY VISIT (OUTPATIENT)
Dept: PHYSICAL THERAPY | Facility: CLINIC | Age: 59
End: 2024-09-23
Payer: COMMERCIAL

## 2024-09-23 DIAGNOSIS — R68.82 DECREASED LIBIDO: ICD-10-CM

## 2024-09-23 DIAGNOSIS — R32 URINARY INCONTINENCE, UNSPECIFIED TYPE: ICD-10-CM

## 2024-09-23 DIAGNOSIS — N81.89 PELVIC FLOOR WEAKNESS: Primary | ICD-10-CM

## 2024-09-23 PROCEDURE — 97161 PT EVAL LOW COMPLEX 20 MIN: CPT | Mod: GP | Performed by: PHYSICAL THERAPIST

## 2024-09-23 PROCEDURE — 97535 SELF CARE MNGMENT TRAINING: CPT | Mod: GP | Performed by: PHYSICAL THERAPIST

## 2024-09-23 PROCEDURE — 97110 THERAPEUTIC EXERCISES: CPT | Mod: GP | Performed by: PHYSICAL THERAPIST

## 2024-09-23 NOTE — PROGRESS NOTES
PHYSICAL THERAPY EVALUATION  Type of Visit: Evaluation       Fall Risk Screen:  Have you fallen 2 or more times in the past year?: No  Have you fallen and had an injury in the past year?: No  Is patient a fall risk?: No    Subjective       Presenting condition or subjective complaint:    Leakage during the day, often with urgency. Also with coughing, sneezing, etc.   Does tend to go JIC, hourly voiding intervals.   Date of onset: 08/15/24 (MD monroy) symptoms chronic, unchanged x years   Relevant medical history:     Dates & types of surgery:      Prior diagnostic imaging/testing results:       Prior therapy history for the same diagnosis, illness or injury: No      Prior Level of Function  Transfers:   Ambulation:   ADL:   IADL:     Living Environment  Social support: With family members   Type of home:     Stairs to enter the home: Yes       Ramp: No   Stairs inside the home: Yes   Is there a railing: Yes     Help at home:    Equipment owned:       Employment: Yes paraprofessional  Hobbies/Interests:      Patient goals for therapy:      Pain assessment: See objective evaluation for additional pain details     Objective      PELVIC EVALUATION  ADDITIONAL HISTORY:  Sex assigned at birth: Female  Gender identity: Female    Pronouns: She/Her Hers      Bladder History:  Feels bladder filling: Yes  Triggers for feeling of inability to wait to go to the bathroom: No    How long can you wait to urinate:  minimal  Gets up at night to urinate: Yes 1 - most nights  Can stop the flow of urine when urinating: Sometimes  Volume of urine usually released:   dependent on fluid intake  Other issues:    Number of bladder infections in last 12 months:  0  Fluid intake per day: 60 oz water 20 caffeine/ tea   Medications taken for bladder: No     Activities causing urine leak: Cough; Sneeze; Hurrying to the bathroom due to a strong urge to urinate (pee)    Amount of urine typically leaked: 1tbs  Pads used to help with leaking: No         Bowel History:  Frequency of bowel movement: 1  daily  Consistency of stool: Soft    Ignores the urge to defecate:    Other bowel issues: Straining to have bowel movement  Length of time spent trying to have a bowel movement:      Sexual Function History:  Sexual orientation: Straight    Sexually active: Yes  Lubrication used: Yes Yes  Pelvic pain:      Pain or difficulty with orgasms/erection/ejaculation: No    State of menopause: Post-menopause (I am done with menopause)  Hormone medications: No      Are you currently pregnant: No  Number of previous pregnancies: 3  Number of deliveries: 2  If you have delivered before, did you have any of these issues during delivery: Vaginal delivery  Have you been diagnosed with pelvic prolapse or abdominal separation: No  Do you get regular exercise: Yes  Have you tried pelvic floor strengthening exercises for 4 weeks: No  Do you have any history of trauma that is relevant to your care that you d like to share: No      Discussed reason for referral regarding pelvic health needs and external/internal pelvic floor muscle examination with patient/guardian.  Opportunity provided to ask questions and verbal consent for assessment and intervention was given.    PAIN: Pain Level at Rest: 0/10 denies  POSTURE:   LUMBAR SCREEN:  Full flexion. Extension to neutral  HIP SCREEN:  ROM WFL  - SUE  Strength:    Functional Strength Testing:   Weakness L dorsiflexion, brace @ baseline  PELVIC/SI SCREEN:  Standing Forward Bend: -    Gillet -  PAIN PROVOCATION TEST:   PELVIS/SI SPECIAL TESTS:   BREATHING SYMMETRY:     PELVIC EXAM  External Visual Inspection:  At rest: Increased genital hiatal opening  With voluntary pelvic floor contraction: Perineal elevation  Relaxation of PFM: Yes  With intra-abdominal pressure: Cough: Perineal descent  Bearing down as defecation: No change    Integumentary:   Introitus: slight erythema    External Digital Palpation per Perineum:   Ischiocavernosis:  Unremarkable  Bulbo cavernosis: Unremarkable  Transverse perineal: Unremarkable  Levator ani: Unremarkable  Perineal body: Unremarkable    Scar:   Location/Type: na  Mobility:     Internal Digital Palpation:  Per Vagina:  Myofascial Resistance to Palpation: Soft  Digital Muscle Performance: P (Power): 2  E (Endurance): 4  R (Repetitions): 4  Compensations: Breath holding  Relaxation Post-Contraction: Normal    Per Rectum:        Pelvic Organ Prolapse:   na    ABDOMINAL ASSESSMENT  Diastasis Rectus Abdominis (LISANDRO):      Abdominal Activation/Strength: SLR: poor load transfer  TrA engagement. Compensate w/ valsalva/bear down    Scar:   Location/Type: na  Mobility:     Fascial Tension/Restriction:     BIOFEEDBACK:  Position:   Surface Electrodes:     Abdominals:     Perianals:       DERMATOMES:   DTR S:     Assessment & Plan   CLINICAL IMPRESSIONS  Medical Diagnosis: Decreased libido (R68.82)    Urinary incontinence, unspecified type (R32)    Treatment Diagnosis: Pelvic floor weakness   Impression/Assessment: Patient is a 58 year old female with stress and urge urinary incontinence complaints.  The following significant findings have been identified: Decreased strength, Impaired muscle performance, and Decreased activity tolerance. These impairments interfere with their ability to perform self care tasks, work tasks, recreational activities, household mobility, and community mobility as compared to previous level of function.     Clinical Decision Making (Complexity):  Clinical Presentation: Stable/Uncomplicated  Clinical Presentation Rationale: based on medical and personal factors listed in PT evaluation  Clinical Decision Making (Complexity): Low complexity    PLAN OF CARE  Treatment Interventions:  Modalities: Biofeedback  Interventions: Manual Therapy, Neuromuscular Re-education, Therapeutic Activity, Therapeutic Exercise, Self-Care/Home Management    Long Term Goals     PT Goal 1  Goal Identifier: 1  Goal  Description: Pt will report minimal leakage with urgency  Rationale: to maximize safety and independence with performance of ADLs and functional tasks;to maximize safety and independence within the community;to maximize safety and independence within the home  Target Date: 12/02/24  PT Goal 2  Goal Identifier: 2  Goal Description: Pt will report minimal leakage with cough/sneeze  Rationale: to maximize safety and independence with performance of ADLs and functional tasks;to maximize safety and independence within the home;to maximize safety and independence with self cares  Target Date: 12/02/24      Frequency of Treatment: 1x/week  Duration of Treatment: 10 weeks    Recommended Referrals to Other Professionals:   Education Assessment:        Risks and benefits of evaluation/treatment have been explained.   Patient/Family/caregiver agrees with Plan of Care.     Evaluation Time:     PT Eval, Low Complexity Minutes (97154): 30       Signing Clinician: Wilma Middleton PT

## 2024-09-27 ENCOUNTER — OFFICE VISIT (OUTPATIENT)
Dept: FAMILY MEDICINE | Facility: CLINIC | Age: 59
End: 2024-09-27
Attending: FAMILY MEDICINE
Payer: COMMERCIAL

## 2024-09-27 VITALS
HEIGHT: 65 IN | WEIGHT: 184.3 LBS | DIASTOLIC BLOOD PRESSURE: 72 MMHG | BODY MASS INDEX: 30.71 KG/M2 | OXYGEN SATURATION: 96 % | RESPIRATION RATE: 16 BRPM | TEMPERATURE: 98.4 F | HEART RATE: 76 BPM | SYSTOLIC BLOOD PRESSURE: 103 MMHG

## 2024-09-27 DIAGNOSIS — E66.811 CLASS 1 OBESITY DUE TO EXCESS CALORIES WITH SERIOUS COMORBIDITY AND BODY MASS INDEX (BMI) OF 30.0 TO 30.9 IN ADULT: Primary | ICD-10-CM

## 2024-09-27 DIAGNOSIS — E78.2 MIXED HYPERLIPIDEMIA: ICD-10-CM

## 2024-09-27 DIAGNOSIS — E78.1 HYPERTRIGLYCERIDEMIA: ICD-10-CM

## 2024-09-27 DIAGNOSIS — E66.09 CLASS 1 OBESITY DUE TO EXCESS CALORIES WITH SERIOUS COMORBIDITY AND BODY MASS INDEX (BMI) OF 30.0 TO 30.9 IN ADULT: Primary | ICD-10-CM

## 2024-09-27 PROCEDURE — 99213 OFFICE O/P EST LOW 20 MIN: CPT | Performed by: FAMILY MEDICINE

## 2024-09-27 PROCEDURE — G2211 COMPLEX E/M VISIT ADD ON: HCPCS | Performed by: FAMILY MEDICINE

## 2024-09-27 NOTE — PROGRESS NOTES
"  Assessment & Plan   Problem List Items Addressed This Visit       Class 1 obesity due to excess calories with serious comorbidity and body mass index (BMI) of 30.0 to 30.9 in adult - Primary     58 year old year old female in clinic today to discuss treatment of the following conditions through diet and lifestyle modification and weight loss:  1. Class 1 obesity due to excess calories with serious comorbidity and body mass index (BMI) of 30.0 to 30.9 in adult    2. Hypertriglyceridemia      The patient's weight loss result since the last visit was successful based on weight loss.  However she is struggling with ongoing symptoms of nausea as well as fatigue.  We reviewed her nutrition and she does rely on carbohydrates to make up what is generally a low calorie nutrition plan.  We discussed that many individuals experience nausea and stomach upset while on a GLP-1 receptor agonist when they eat foods that are not protein, vegetables or fruit.  I suggested that she shift her breakfast and lunch food intake slightly to see if it alleviates her symptoms.  We also discussed going down on her dosing to see if this alleviates her side effects.  Her current dose has been 10 mg and it is unclear if she would get insurance coverage at 7.5 mg.  I sent the medicine to the pharmacy to check.  At this point, her exercises predominantly cardiovascular in the form of walking.  At some point the future I will encourage her to add more strength training.      BMI: Body mass index is 30.67 kg/m .  Ideal body weight: 57 kg (125 lb 10.6 oz)  Adjusted ideal body weight: 67.6 kg (149 lb 1.9 oz)    Current therapies:    Medications: YES Zepbound   - Starting weight for GLP1 receptor agonist: 214 lbs   - Total weight loss: 30 lbs (14%)    Nutritional goals/strategies: reviewed.   - caloric restriction: Yes   - time restriction: NO   - diet (macronutrient) framework: \"Low calorie predominantly     Movement:   - predominantly " "cardiovascular    Follow-up: 3 months           Relevant Medications    tirzepatide-Weight Management (ZEPBOUND) 7.5 MG/0.5ML prefilled pen    Hypertriglyceridemia    Relevant Medications    tirzepatide-Weight Management (ZEPBOUND) 7.5 MG/0.5ML prefilled pen    Mixed hyperlipidemia    Relevant Medications    tirzepatide-Weight Management (ZEPBOUND) 7.5 MG/0.5ML prefilled pen       BMI  Estimated body mass index is 30.67 kg/m  as calculated from the following:    Height as of this encounter: 1.651 m (5' 5\").    Weight as of this encounter: 83.6 kg (184 lb 4.8 oz).   Weight management plan: Specific weight management program called comprehensive weight management discussed    The longitudinal plan of care for the diagnosis(es)/condition(s) as documented were addressed during this visit. Due to the added complexity in care, I will continue to support Angelina in the subsequent management and with ongoing continuity of care.    Cipriano Alfaro is a 58 year old, presenting for the following health issues:  weight loss follow up        9/27/2024     4:53 PM   Additional Questions   Roomed by ac   Accompanied by self     Weight loss follow-up:  - The patient is a bit frustrated that she is persistently nauseated.  She also has been experiencing fatigue much of the time.  Despite this, she likes that her appetite is reduced and that she has managed to lose weight with some maintenance.  She had questions today around duration of therapy.  Her recent nutritional plan is as follows:  -Breakfast is a protein bar  - Lunch is a bagel and cream cheese  - Dinner is animal-based protein and a vegetable  - Snacks are unusual but can include cereal  - Generally water but she does consume iced tea with sugar.    Exercises predominantly walking and she generally gets about 10,000 steps per day.    Sleep: Okay.    History of Present Illness       Reason for visit:  Medication renewal    She eats 2-3 servings of fruits and " "vegetables daily.She consumes 1 sweetened beverage(s) daily.She exercises with enough effort to increase her heart rate 20 to 29 minutes per day.  She exercises with enough effort to increase her heart rate 5 days per week.   She is taking medications regularly.          Objective    /72 (BP Location: Left arm, Patient Position: Sitting, Cuff Size: Adult Regular)   Pulse 76   Temp 98.4  F (36.9  C) (Oral)   Resp 16   Ht 1.651 m (5' 5\")   Wt 83.6 kg (184 lb 4.8 oz)   SpO2 96%   BMI 30.67 kg/m    Body mass index is 30.67 kg/m .  Physical Exam  Nursing note reviewed.   Constitutional:       General: She is not in acute distress.     Appearance: Normal appearance. She is not ill-appearing.   HENT:      Head: Normocephalic and atraumatic.   Eyes:      Extraocular Movements: Extraocular movements intact.      Conjunctiva/sclera: Conjunctivae normal.   Pulmonary:      Effort: Pulmonary effort is normal.   Neurological:      Mental Status: She is alert and oriented to person, place, and time.   Psychiatric:         Attention and Perception: Attention normal.         Mood and Affect: Mood normal.         Speech: Speech normal.         Thought Content: Thought content normal.                    Signed Electronically by: Jacobo Aguilar MD    "

## 2024-09-27 NOTE — ASSESSMENT & PLAN NOTE
"58 year old year old female in clinic today to discuss treatment of the following conditions through diet and lifestyle modification and weight loss:  1. Class 1 obesity due to excess calories with serious comorbidity and body mass index (BMI) of 30.0 to 30.9 in adult    2. Hypertriglyceridemia      The patient's weight loss result since the last visit was successful based on weight loss.  However she is struggling with ongoing symptoms of nausea as well as fatigue.  We reviewed her nutrition and she does rely on carbohydrates to make up what is generally a low calorie nutrition plan.  We discussed that many individuals experience nausea and stomach upset while on a GLP-1 receptor agonist when they eat foods that are not protein, vegetables or fruit.  I suggested that she shift her breakfast and lunch food intake slightly to see if it alleviates her symptoms.  We also discussed going down on her dosing to see if this alleviates her side effects.  Her current dose has been 10 mg and it is unclear if she would get insurance coverage at 7.5 mg.  I sent the medicine to the pharmacy to check.  At this point, her exercises predominantly cardiovascular in the form of walking.  At some point the future I will encourage her to add more strength training.      BMI: Body mass index is 30.67 kg/m .  Ideal body weight: 57 kg (125 lb 10.6 oz)  Adjusted ideal body weight: 67.6 kg (149 lb 1.9 oz)    Current therapies:    Medications: YES Zepbound   - Starting weight for GLP1 receptor agonist: 214 lbs   - Total weight loss: 30 lbs (14%)    Nutritional goals/strategies: reviewed.   - caloric restriction: Yes   - time restriction: NO   - diet (macronutrient) framework: \"Low calorie predominantly     Movement:   - predominantly cardiovascular    Follow-up: 3 months    "

## 2024-09-30 ENCOUNTER — THERAPY VISIT (OUTPATIENT)
Dept: PHYSICAL THERAPY | Facility: CLINIC | Age: 59
End: 2024-09-30
Payer: COMMERCIAL

## 2024-09-30 DIAGNOSIS — N81.89 PELVIC FLOOR WEAKNESS: Primary | ICD-10-CM

## 2024-09-30 PROCEDURE — 97110 THERAPEUTIC EXERCISES: CPT | Mod: GP | Performed by: PHYSICAL THERAPIST

## 2024-09-30 PROCEDURE — 97535 SELF CARE MNGMENT TRAINING: CPT | Mod: GP | Performed by: PHYSICAL THERAPIST

## 2024-10-07 ENCOUNTER — THERAPY VISIT (OUTPATIENT)
Dept: PHYSICAL THERAPY | Facility: CLINIC | Age: 59
End: 2024-10-07
Payer: COMMERCIAL

## 2024-10-07 DIAGNOSIS — N81.89 PELVIC FLOOR WEAKNESS: Primary | ICD-10-CM

## 2024-10-07 PROCEDURE — 97110 THERAPEUTIC EXERCISES: CPT | Mod: GP | Performed by: PHYSICAL THERAPIST

## 2024-10-07 PROCEDURE — 97112 NEUROMUSCULAR REEDUCATION: CPT | Mod: GP | Performed by: PHYSICAL THERAPIST

## 2024-10-14 ENCOUNTER — THERAPY VISIT (OUTPATIENT)
Dept: PHYSICAL THERAPY | Facility: CLINIC | Age: 59
End: 2024-10-14
Payer: COMMERCIAL

## 2024-10-14 DIAGNOSIS — N81.89 PELVIC FLOOR WEAKNESS: Primary | ICD-10-CM

## 2024-10-14 PROCEDURE — 97112 NEUROMUSCULAR REEDUCATION: CPT | Mod: GP | Performed by: PHYSICAL THERAPIST

## 2024-10-14 PROCEDURE — 97110 THERAPEUTIC EXERCISES: CPT | Mod: GP | Performed by: PHYSICAL THERAPIST

## 2024-11-26 ENCOUNTER — VIRTUAL VISIT (OUTPATIENT)
Dept: SURGERY | Facility: CLINIC | Age: 59
End: 2024-11-26
Payer: COMMERCIAL

## 2024-11-26 DIAGNOSIS — E66.3 OVERWEIGHT (BMI 25.0-29.9): Primary | ICD-10-CM

## 2024-11-26 PROCEDURE — 97803 MED NUTRITION INDIV SUBSEQ: CPT | Mod: 95

## 2024-11-26 NOTE — PATIENT INSTRUCTIONS
Meal 1: Breakfast  Egg White Scramble with Veggies  1 cup egg whites (120 jose, 24g protein)  1/4 cup shredded cheese (80 jose, 7g protein)  1/2 cup spinach and tomatoes (15 jose, 1g protein)  1 slice whole-grain toast (80 jose, 4g protein)  Total: 295 jose, 36g protein    Meal 2: Mid-Morning Snack  Non-fat Greek yogurt (150g, plain) (90 jose, 15g protein)  1/2 medium apple (50 jose, 0g protein)  Total: 140 jose, 15g protein    Meal 3: Lunch  Grilled Chicken Salad  3 oz grilled chicken breast (140 jose, 26g protein)  2 cups mixed greens (10 jose, 1g protein)  1 tbsp olive oil + lemon dressing (120 jose, 0g protein)  Total: 270 jose, 27g protein    Meal 4: Afternoon Snack  Cottage Cheese with Berries  1/2 cup low-fat cottage cheese (90 jose, 12g protein)  1/4 cup fresh blueberries (20 jose, 0g protein)  Total: 110 jose, 12g protein    Meal 5: Dinner  Turkey Wrap  1 small whole-grain tortilla (100 jose, 5g protein)  2 oz lean turkey slices (90 jose, 16g protein)  1 tbsp hummus (35 jose, 1g protein)  1/4 cup shredded lettuce + cucumber (10 jose, 0g protein)  Total: 235 jose, 22g protein    Meal 6: Evening Snack  Protein Smoothie  1 scoop whey protein mixed with water (100 jose, 20g protein)  Total: 100 jose, 20g protein    Daily Totals  Calories: 1,250-1,300 jose  Protein: 75g

## 2024-11-26 NOTE — LETTER
11/26/2024      Angelina Sim  [address] [place] [address]   [place] [address]      Dear Colleague,    Thank you for referring your patient, Angelina Sim, to the [facility] [place]. Please see a copy of my visit note below.    Angelina Sim is a 59 year old who is being evaluated via a billable video visit.          Medical  Weight Loss Follow-Up Diet Evaluation  Assessment:  Angelina is presenting today for a follow up weight management nutrition consultation.  This patient has had an initial appointment and was referred by Dr. Van  for MNT as treatment for  excessive weight .  Weight loss medication:  Zepbound .   Pt's weight is 176 lbs   Initial weight: 192 lbs  Weight change: 16 lbs down         No data to display              BMI: There is no height or weight on file to calculate BMI.  Ideal body weight: 57 kg (125 lb 10.6 oz)  Adjusted ideal body weight: 67.6 kg (149 lb 1.9 oz)    Estimated RMR ([place]-St Evansor equation):   1455 kcals x 1.2 (sedentary) = 1745 kcals (for weight maintenance)  1455 kcals x 1.3 (light active) = 2000 kcals (for weight maintenance)  Recommended Protein Intake: 60-80 grams of protein/day  Patient Active Problem List:  Patient Active Problem List   Diagnosis     Atypical ductal hyperplasia of breast     Hypertriglyceridemia     Cough headache     Melanoma of skin (H)     Elevated coronary artery calcium score     Thyroid nodule     Anxiety     Mild episode of recurrent major depressive disorder (H)     Prediabetes     Abnormal Pap smear of cervix     Mixed hyperlipidemia     Class 1 obesity due to excess calories with serious comorbidity and body mass index (BMI) of 30.0 to 30.9 in adult     Pelvic floor weakness       Progress on goals from last visit: Patient reports the GLP-1 makes her nauseous which makes eating difficult. She feels ill the whole time taking medication. Feels she can tolerate starches and breads better  than other foods. Eating three meals. Is eating a breakfast. Low protein throughout the day.      Target 1,200 kcal with  minimum 60 g protein per day - not fully met protein goal   Pair breakfast with lean protein and fruit servings - not met   Choose fat free/low fat dairy products - reports she is trying to make changes.     Dietary Recall:  Breakfast: Protein bar   Lunch: bagel with cream cheese   Dinner: pasta (spaghetti) with meat sauce OR hamburgers with cheese OR steak with veggies   Typical Snacks: nuts or peanut M&M's  Overnight eating: No  Eating out: 1-2x per week   Beverages:   Water 40-60oz   Exercise:   Walks often, 10k steps per day.     Nutrition Diagnosis:    Overweight related to excessive energy intake as evidence by patients subjective reports (inconsistency with meals, high carb diet) and BMI of 29.4        Intervention:  Food and/or nutrient delivery: consistency with meals, protein goals, fiber goals. Encouraged patient to try 5-6 small meals.  Nutrition education: benefits of meeting hydration and protein goals while taking GLP-1  Nutrition counseling: goal setting   Coordination of nutrition care: n/a    Monitoring/Evaluation:    Goals:  Target 5-6 small meals providing 10-15g protein per small meal   Increase fiber targeting 25g per day     Patient to follow up  3 month(s) with CHARANJIT        Video-Visit Details    Type of service:  Video Visit    Video Start Time (time video started): 3:31 PM    Video End Time (time video stopped): 3:25 PM    Originating Location (pt. Location): Home      Distant Location (provider location):  Off-site    Mode of Communication:  Video Conference via [facility]    Physician has received verbal consent for a Video Visit from the patient? Yes      Suly Morrow RD           Again, thank you for allowing me to participate in the care of your patient.        Sincerely,        Suly Morrow RD

## 2024-11-26 NOTE — PROGRESS NOTES
Angelina Sim is a 59 year old who is being evaluated via a billable video visit.          Medical  Weight Loss Follow-Up Diet Evaluation  Assessment:  Angelina is presenting today for a follow up weight management nutrition consultation.  This patient has had an initial appointment and was referred by Dr. Van  for MNT as treatment for  excessive weight .  Weight loss medication:  Zepbound .   Pt's weight is 176 lbs   Initial weight: 192 lbs  Weight change: 16 lbs down         No data to display              BMI: There is no height or weight on file to calculate BMI.  Ideal body weight: 57 kg (125 lb 10.6 oz)  Adjusted ideal body weight: 67.6 kg (149 lb 1.9 oz)    Estimated RMR (Richy-St Ch equation):   1455 kcals x 1.2 (sedentary) = 1745 kcals (for weight maintenance)  1455 kcals x 1.3 (light active) = 2000 kcals (for weight maintenance)  Recommended Protein Intake: 60-80 grams of protein/day  Patient Active Problem List:  Patient Active Problem List   Diagnosis    Atypical ductal hyperplasia of breast    Hypertriglyceridemia    Cough headache    Melanoma of skin (H)    Elevated coronary artery calcium score    Thyroid nodule    Anxiety    Mild episode of recurrent major depressive disorder (H)    Prediabetes    Abnormal Pap smear of cervix    Mixed hyperlipidemia    Class 1 obesity due to excess calories with serious comorbidity and body mass index (BMI) of 30.0 to 30.9 in adult    Pelvic floor weakness       Progress on goals from last visit: Patient reports the GLP-1 makes her nauseous which makes eating difficult. She feels ill the whole time taking medication. Feels she can tolerate starches and breads better than other foods. Eating three meals. Is eating a breakfast. Low protein throughout the day.      Target 1,200 kcal with  minimum 60 g protein per day - not fully met protein goal   Pair breakfast with lean protein and fruit servings - not met   Choose fat free/low fat dairy products -  reports she is trying to make changes.     Dietary Recall:  Breakfast: Protein bar   Lunch: bagel with cream cheese   Dinner: pasta (spaghetti) with meat sauce OR hamburgers with cheese OR steak with veggies   Typical Snacks: nuts or peanut M&M's  Overnight eating: No  Eating out: 1-2x per week   Beverages:   Water 40-60oz   Exercise:   Walks often, 10k steps per day.     Nutrition Diagnosis:    Overweight related to excessive energy intake as evidence by patients subjective reports (inconsistency with meals, high carb diet) and BMI of 29.4        Intervention:  Food and/or nutrient delivery: consistency with meals, protein goals, fiber goals. Encouraged patient to try 5-6 small meals.  Nutrition education: benefits of meeting hydration and protein goals while taking GLP-1  Nutrition counseling: goal setting   Coordination of nutrition care: n/a    Monitoring/Evaluation:    Goals:  Target 5-6 small meals providing 10-15g protein per small meal   Increase fiber targeting 25g per day     Patient to follow up  3 month(s) with CHARANJIT        Video-Visit Details    Type of service:  Video Visit    Video Start Time (time video started): 3:31 PM    Video End Time (time video stopped): 3:25 PM    Originating Location (pt. Location): Home      Distant Location (provider location):  Off-site    Mode of Communication:  Video Conference via [facility]    Physician has received verbal consent for a Video Visit from the patient? Yes      Suly Morrow RD

## 2024-12-23 ENCOUNTER — VIRTUAL VISIT (OUTPATIENT)
Dept: FAMILY MEDICINE | Facility: CLINIC | Age: 59
End: 2024-12-23
Payer: COMMERCIAL

## 2024-12-23 DIAGNOSIS — F41.9 ANXIETY: ICD-10-CM

## 2024-12-23 DIAGNOSIS — E66.09 CLASS 1 OBESITY DUE TO EXCESS CALORIES WITH SERIOUS COMORBIDITY AND BODY MASS INDEX (BMI) OF 30.0 TO 30.9 IN ADULT: Primary | ICD-10-CM

## 2024-12-23 DIAGNOSIS — E78.1 HYPERTRIGLYCERIDEMIA: ICD-10-CM

## 2024-12-23 DIAGNOSIS — R73.03 PREDIABETES: ICD-10-CM

## 2024-12-23 DIAGNOSIS — E78.2 MIXED HYPERLIPIDEMIA: ICD-10-CM

## 2024-12-23 DIAGNOSIS — R63.2 BINGE EATING: ICD-10-CM

## 2024-12-23 DIAGNOSIS — E66.811 CLASS 1 OBESITY DUE TO EXCESS CALORIES WITH SERIOUS COMORBIDITY AND BODY MASS INDEX (BMI) OF 30.0 TO 30.9 IN ADULT: Primary | ICD-10-CM

## 2024-12-23 PROCEDURE — G2211 COMPLEX E/M VISIT ADD ON: HCPCS | Mod: 95 | Performed by: FAMILY MEDICINE

## 2024-12-23 PROCEDURE — 99214 OFFICE O/P EST MOD 30 MIN: CPT | Mod: 95 | Performed by: FAMILY MEDICINE

## 2024-12-23 RX ORDER — LISDEXAMFETAMINE DIMESYLATE 30 MG/1
30 CAPSULE ORAL EVERY MORNING
Qty: 15 CAPSULE | Refills: 0 | Status: SHIPPED | OUTPATIENT
Start: 2024-12-23

## 2024-12-23 RX ORDER — ATORVASTATIN CALCIUM 40 MG/1
40 TABLET, FILM COATED ORAL DAILY
COMMUNITY
Start: 2024-12-23

## 2024-12-23 ASSESSMENT — PATIENT HEALTH QUESTIONNAIRE - PHQ9
SUM OF ALL RESPONSES TO PHQ QUESTIONS 1-9: 0
10. IF YOU CHECKED OFF ANY PROBLEMS, HOW DIFFICULT HAVE THESE PROBLEMS MADE IT FOR YOU TO DO YOUR WORK, TAKE CARE OF THINGS AT HOME, OR GET ALONG WITH OTHER PEOPLE: NOT DIFFICULT AT ALL
SUM OF ALL RESPONSES TO PHQ QUESTIONS 1-9: 0

## 2024-12-23 NOTE — ASSESSMENT & PLAN NOTE
This patient has lost weight while on tirzepatide.  However, for the duration of time and when she was on the medicine she had fairly severe side effects.  She did drop approximately 35 pounds.  Slow weight gain since coming off the medicine.  Binging behaviors have recurred.  Comorbid anxiety and depression.  We have previously discussed the potential benefits of lisdexamfetamine.  Blood pressure is consistently been low.  We will add 30 mg lisdexamfetamine with a limited dispense.  Plan to increase to 50 mg assuming she does not have side effects.  I recommended she follow-up with her primary care provider.  I would like to see her back in 2-4 months.

## 2024-12-23 NOTE — PROGRESS NOTES
Angelina is a 59 year old who is being evaluated via a billable video visit.    How would you like to obtain your AVS? MyChart  If the video visit is dropped, the invitation should be resent by: Send to e-mail at: [email or web address]  Will anyone else be joining your video visit? No    Assessment & Plan   Problem List Items Addressed This Visit       Anxiety    Binge eating    Relevant Medications    lisdexamfetamine (VYVANSE) 30 MG capsule    Class 1 obesity due to excess calories with serious comorbidity and body mass index (BMI) of 30.0 to 30.9 in adult - Primary     This patient has lost weight while on tirzepatide.  However, for the duration of time and when she was on the medicine she had fairly severe side effects.  She did drop approximately 35 pounds.  Slow weight gain since coming off the medicine.  Binging behaviors have recurred.  Comorbid anxiety and depression.  We have previously discussed the potential benefits of lisdexamfetamine.  Blood pressure is consistently been low.  We will add 30 mg lisdexamfetamine with a limited dispense.  Plan to increase to 50 mg assuming she does not have side effects.  I recommended she follow-up with her primary care provider.  I would like to see her back in 2-4 months.         Relevant Medications    lisdexamfetamine (VYVANSE) 30 MG capsule    Hypertriglyceridemia    Relevant Medications    atorvastatin (LIPITOR) 40 MG tablet    Mixed hyperlipidemia    Relevant Medications    atorvastatin (LIPITOR) 40 MG tablet    Prediabetes      The longitudinal plan of care for the diagnosis(es)/condition(s) as documented were addressed during this visit. Due to the added complexity in care, I will continue to support Angelina in the subsequent management and with ongoing continuity of care.    Cipriano Alfaro is a 59 year old, presenting for the following health issues:  Recheck Medication        12/23/2024     8:58 AM   Additional Questions   Roomed by Cornelius Michelle MA  "  Accompanied by Self         12/23/2024     8:58 AM   Patient Reported Additional Medications   Patient reports taking the following new medications None     Patient presents for treatment of chronic, comorbid conditions using intensive therapeutic lifestyle interventions including nutrition, physical activity, and behavior management.   - She is not no longer on zepbound   - successes: \"no longer nauseous\"   - struggles: \"slowly by surely gaining weight.\"  More cravings.    - tracking/journaling: trying to get more protein. \"Not great with fruits and veggies.\"    - hunger: more binging   - medication benefits: NA    History of Present Illness       Reason for visit:  I would like to talk about going on Vyvanse as we spoke about in the past.    She eats 2-3 servings of fruits and vegetables daily.She consumes 2 sweetened beverage(s) daily.She exercises with enough effort to increase her heart rate 10 to 19 minutes per day.  She exercises with enough effort to increase her heart rate 3 or less days per week. She is missing 7 dose(s) of medications per week.        Objective    Vitals - Patient Reported  Weight (Patient Reported): 81.6 kg (180 lb)  Height (Patient Reported): 165.1 cm (5' 5\")  BMI (Based on Pt Reported Ht/Wt): 29.95        Physical Exam   GENERAL: alert and no distress  EYES: Eyes grossly normal to inspection.  No discharge or erythema, or obvious scleral/conjunctival abnormalities.  RESP: No audible wheeze, cough, or visible cyanosis.    SKIN: Visible skin clear. No significant rash, abnormal pigmentation or lesions.  NEURO: Cranial nerves grossly intact.  Mentation and speech appropriate for age.  PSYCH: Appropriate affect, tone, and pace of words      Video-Visit Details    Type of service:  Video Visit   Originating Location (pt. Location): Home  Distant Location (provider location):  On-site  Platform used for Video Visit: Gaudencio  Signed Electronically by: Jacobo Aguilar MD    "

## 2025-02-06 ENCOUNTER — MYC MEDICAL ADVICE (OUTPATIENT)
Dept: FAMILY MEDICINE | Facility: CLINIC | Age: 60
End: 2025-02-06
Payer: COMMERCIAL

## 2025-02-06 DIAGNOSIS — E66.811 CLASS 1 OBESITY DUE TO EXCESS CALORIES WITH SERIOUS COMORBIDITY AND BODY MASS INDEX (BMI) OF 30.0 TO 30.9 IN ADULT: ICD-10-CM

## 2025-02-06 DIAGNOSIS — E66.09 CLASS 1 OBESITY DUE TO EXCESS CALORIES WITH SERIOUS COMORBIDITY AND BODY MASS INDEX (BMI) OF 30.0 TO 30.9 IN ADULT: ICD-10-CM

## 2025-02-06 RX ORDER — LISDEXAMFETAMINE DIMESYLATE 60 MG/1
60 CAPSULE ORAL EVERY MORNING
Qty: 30 CAPSULE | Refills: 0 | Status: SHIPPED | OUTPATIENT
Start: 2025-02-06

## 2025-02-06 NOTE — TELEPHONE ENCOUNTER
12/23 OV note-   Class 1 obesity due to excess calories with serious comorbidity and body mass index (BMI) of 30.0 to 30.9 in adult - Primary        This patient has lost weight while on tirzepatide.  However, for the duration of time and when she was on the medicine she had fairly severe side effects.  She did drop approximately 35 pounds.  Slow weight gain since coming off the medicine.  Binging behaviors have recurred.  Comorbid anxiety and depression.  We have previously discussed the potential benefits of lisdexamfetamine.  Blood pressure is consistently been low.  We will add 30 mg lisdexamfetamine with a limited dispense.  Plan to increase to 50 mg assuming she does not have side effects.  I recommended she follow-up with her primary care provider.  I would like to see her back in 2-4 months.

## 2025-02-10 ENCOUNTER — MYC MEDICAL ADVICE (OUTPATIENT)
Dept: FAMILY MEDICINE | Facility: CLINIC | Age: 60
End: 2025-02-10
Payer: COMMERCIAL

## 2025-02-10 DIAGNOSIS — E78.1 HYPERTRIGLYCERIDEMIA: ICD-10-CM

## 2025-02-10 RX ORDER — ATORVASTATIN CALCIUM 40 MG/1
40 TABLET, FILM COATED ORAL DAILY
Qty: 30 TABLET | Refills: 0 | Status: SHIPPED | OUTPATIENT
Start: 2025-02-10

## 2025-02-25 ENCOUNTER — VIRTUAL VISIT (OUTPATIENT)
Dept: SURGERY | Facility: CLINIC | Age: 60
End: 2025-02-25
Payer: COMMERCIAL

## 2025-02-25 DIAGNOSIS — E66.9 OBESITY (BMI 30-39.9): Primary | ICD-10-CM

## 2025-02-25 PROCEDURE — 97803 MED NUTRITION INDIV SUBSEQ: CPT | Mod: 93

## 2025-02-25 PROCEDURE — 98013 SYNCH AUDIO-ONLY EST LOW 20: CPT

## 2025-02-25 NOTE — PROGRESS NOTES
"Angelina Sim is a 59 year old who is being evaluated via a billable telephone visit.          Medical  Weight Loss Follow-Up Diet Evaluation  Assessment:  Angelina is presenting today for a follow up weight management nutrition consultation.  This patient has had an initial appointment and was referred by Dr. Van  for MNT as treatment for Obesity.  Weight loss medication:  Vyvanse .   Pt's weight is 187 lbs   Initial weight: 192 lbs  Weight change: 11 lbs gain in 3 months         No data to display              BMI: There is no height or weight on file to calculate BMI.  Ideal body weight: 57 kg (125 lb 10.6 oz)  Adjusted ideal body weight: 67.6 kg (149 lb 1.9 oz)    Estimated RMR (Richy-St Ch equation):   1455 kcals x 1.2 (sedentary) = 1745 kcals (for weight maintenance)  1455 kcals x 1.3 (light active) = 2000 kcals (for weight maintenance)  Recommended Protein Intake: 60-80 grams of protein/day  Patient Active Problem List:  Patient Active Problem List   Diagnosis    Atypical ductal hyperplasia of breast    Hypertriglyceridemia    Cough headache    Melanoma of skin (H)    Elevated coronary artery calcium score    Thyroid nodule    Anxiety    Mild episode of recurrent major depressive disorder    Prediabetes    Abnormal Pap smear of cervix    Mixed hyperlipidemia    Class 1 obesity due to excess calories with serious comorbidity and body mass index (BMI) of 30.0 to 30.9 in adult    Pelvic floor weakness    Binge eating       Progress on goals from last visit: Patient reprots she is doing better on Vyvanse. Reports she is concerned with re weight gain. Reports she does not have a \"typical\" dietary recall.     Target 5-6 small meals providing 10-15g protein per small meal   Increase fiber targeting 25g per day       Dietary Recall:  Breakfast: Protein bar or [place] greek yogurt (high fat) or breakfast smoothie (11g protein, 42g total sugars, 5g total fats 350kcal)   Lunch: bagel with cream cheese "   Dinner: pasta (spaghetti) with meat sauce OR hamburgers with cheese OR steak with veggies   Typical Snacks: nuts or peanut M&M's  Overnight eating: No  Eating out: 1-2x per week   Beverages:   Water 40-60oz   Exercise:   Walks often, 10k steps per day.   Working out in the AM    Nutrition Diagnosis:    Overweight related to excessive energy intake as evidence by patients subjective reports (inconsistency with meals, high carb diet) and BMI of 30.67        Intervention:  Food and/or nutrient delivery: consistency with meals, protein goals, fiber goals.  Nutrition education: benefits of low fat dairy products with weight loss goals   Nutrition counseling: goal setting   Coordination of nutrition care: n/a    Monitoring/Evaluation:    Goals:  Follow 10/10 rule  Replace breakfast smoothie for drinkable yogurt (20g)  Choose low fat dairy products   Incorporate fiber with low fat dairy products to ensure fullness/satiety       Patient to follow up  3 month(s) with CHARANJIT        Virtual Visit Details    Type of service:  Telephone Visit   Phone call duration: 26 minutes   Originating Location (pt. Location): Home    Distant Location (provider location):  Off-site  Telephone visit completed due to the patient did not have access to video, while the distant provider did.        Suly Morrow RD

## 2025-02-25 NOTE — PATIENT INSTRUCTIONS
"Protein Bars:    Quest  Pure Protein  Go Macro  RX Bar  Kind Protein  Think! High Protein Bars  [place] Protein Bar  Epic Bars  Larabar Protein  Skout Bar  KiZe  Aldi Elevation Protein Nutrition Bar     **Protein Bars can be a convenient snack between meals, while traveling, or pre/post workout but they should not regularly replace a meal and are not recommended for every snack or multiple times a day. Many of these bars are calorie dense, so two or more per day to contribute a significant amount of calories and thus make weight loss more difficult.       Patient Education   Food Label: The 10-10 Rule  You will find a nutrition label on most food items you buy. This is a tool to help you make good food choices.  Take a look at serving size first.  The 10-10 Rule  Less than 10 grams (g) of total fat per serving.  Less than 10g of total sugar per serving.  Also look for:  Sodium per serving: Less than 300mg.  Dietary fiber per serving: More than 3g.  Added sugars per serving: Less than 5g.    For informational purposes only. Not to replace the advice of your health care provider. Copyright   2019 [facility]. Clinically reviewed by [employer] Registered Dietitian Team. All rights reserved. [employer] 082525 - Rev 04/24.      High protein breakfast ideas:  -Baldwin products (high protein brand) - oatmeal, muffins, pancakes, etc.  -Overnight oats - there are easy \"just add water\" kinds in the grocery store or lots of recipes out there, look for one that has added protein from liquid or powder or other source  -Breakfast Egg Casseroles  -Scrambled eggs with cottage cheese whipped in  -English muffin breakfast sandwiches or burritos  -Frozen breakfast bowls OR \"Add an Egg\" bowls  -Protein bars - 10/10 rule is a good rule of thumb (Brands: [place] Protein Bar, RX bar, Skout Protein Bars)  -High protein tortillas or low carb tortilla for breakfast burrito  -Turkey, Veggie, Chicken, Black bean " "burgers in the frozen section - add cheese and fried egg on top  -Greek Yogurt (examples: plain greek, Oikos Triple Zero, Dannon Light N Fit, Two Good Yogurt), with choice of fresh or frozen fruit, india seeds, hemp seeds, nuts  -1 slice whole wheat bread with mini avocado or half regular sized avocado, \"Everything But the Bagel\" seasoning, Latvian vann on the side or on top  -2-3 light string cheeses with fruit (banana, fruit cup, berries, etc.)  -Cottage cheese and fruit on top  -Breakfast Skillet: sauteed bell peppers, onions with eggs and sprinkle of cheese (tip: batch prep sauteed peppers and onions for the week for a faster breakfast)      "

## 2025-02-25 NOTE — LETTER
"2/25/2025      Angelina Sim  [address] [place] [address]   [place] [address]      Dear Colleague,    Thank you for referring your patient, Angelina Sim, to the [facility] [place]. Please see a copy of my visit note below.    Angelina Sim is a 59 year old who is being evaluated via a billable telephone visit.          Medical  Weight Loss Follow-Up Diet Evaluation  Assessment:  Angelina is presenting today for a follow up weight management nutrition consultation.  This patient has had an initial appointment and was referred by Dr. Van  for MNT as treatment for Obesity.  Weight loss medication:  Vyvanse .   Pt's weight is 187 lbs   Initial weight: 192 lbs  Weight change: 11 lbs gain in 3 months         No data to display              BMI: There is no height or weight on file to calculate BMI.  Ideal body weight: 57 kg (125 lb 10.6 oz)  Adjusted ideal body weight: 67.6 kg (149 lb 1.9 oz)    Estimated RMR (Hinds-St Jeor equation):   1455 kcals x 1.2 (sedentary) = 1745 kcals (for weight maintenance)  1455 kcals x 1.3 (light active) = 2000 kcals (for weight maintenance)  Recommended Protein Intake: 60-80 grams of protein/day  Patient Active Problem List:  Patient Active Problem List   Diagnosis     Atypical ductal hyperplasia of breast     Hypertriglyceridemia     Cough headache     Melanoma of skin (H)     Elevated coronary artery calcium score     Thyroid nodule     Anxiety     Mild episode of recurrent major depressive disorder     Prediabetes     Abnormal Pap smear of cervix     Mixed hyperlipidemia     Class 1 obesity due to excess calories with serious comorbidity and body mass index (BMI) of 30.0 to 30.9 in adult     Pelvic floor weakness     Binge eating       Progress on goals from last visit: Patient reprots she is doing better on Vyvanse. Reports she is concerned with re weight gain. Reports she does not have a \"typical\" dietary recall.     Target " 5-6 small meals providing 10-15g protein per small meal   Increase fiber targeting 25g per day       Dietary Recall:  Breakfast: Protein bar or [place] greek yogurt (high fat) or breakfast smoothie (11g protein, 42g total sugars, 5g total fats 350kcal)   Lunch: bagel with cream cheese   Dinner: pasta (spaghetti) with meat sauce OR hamburgers with cheese OR steak with veggies   Typical Snacks: nuts or peanut M&M's  Overnight eating: No  Eating out: 1-2x per week   Beverages:   Water 40-60oz   Exercise:   Walks often, 10k steps per day.   Working out in the AM    Nutrition Diagnosis:    Overweight related to excessive energy intake as evidence by patients subjective reports (inconsistency with meals, high carb diet) and BMI of 30.67        Intervention:  Food and/or nutrient delivery: consistency with meals, protein goals, fiber goals.  Nutrition education: benefits of low fat dairy products with weight loss goals   Nutrition counseling: goal setting   Coordination of nutrition care: n/a    Monitoring/Evaluation:    Goals:  Follow 10/10 rule  Replace breakfast smoothie for drinkable yogurt (20g)  Choose low fat dairy products   Incorporate fiber with low fat dairy products to ensure fullness/satiety       Patient to follow up  3 month(s) with RD        Virtual Visit Details    Type of service:  Telephone Visit   Phone call duration: 26 minutes   Originating Location (pt. Location): Home    Distant Location (provider location):  Off-site  Telephone visit completed due to the patient did not have access to video, while the distant provider did.        Suly Morrow RD           Again, thank you for allowing me to participate in the care of your patient.        Sincerely,        Suly Morrow RD    Electronically signed

## 2025-03-17 DIAGNOSIS — E78.1 HYPERTRIGLYCERIDEMIA: ICD-10-CM

## 2025-03-17 PROBLEM — N81.89 PELVIC FLOOR WEAKNESS: Status: RESOLVED | Noted: 2024-09-23 | Resolved: 2025-03-17

## 2025-03-18 RX ORDER — ATORVASTATIN CALCIUM 40 MG/1
40 TABLET, FILM COATED ORAL DAILY
Qty: 90 TABLET | Refills: 0 | Status: SHIPPED | OUTPATIENT
Start: 2025-03-18

## 2025-03-26 ENCOUNTER — VIRTUAL VISIT (OUTPATIENT)
Dept: FAMILY MEDICINE | Facility: CLINIC | Age: 60
End: 2025-03-26
Attending: FAMILY MEDICINE
Payer: COMMERCIAL

## 2025-03-26 DIAGNOSIS — Z12.31 VISIT FOR SCREENING MAMMOGRAM: ICD-10-CM

## 2025-03-26 DIAGNOSIS — R73.03 PREDIABETES: ICD-10-CM

## 2025-03-26 DIAGNOSIS — E66.09 CLASS 1 OBESITY DUE TO EXCESS CALORIES WITH SERIOUS COMORBIDITY AND BODY MASS INDEX (BMI) OF 30.0 TO 30.9 IN ADULT: Primary | ICD-10-CM

## 2025-03-26 DIAGNOSIS — R63.2 BINGE EATING: ICD-10-CM

## 2025-03-26 DIAGNOSIS — E78.1 HYPERTRIGLYCERIDEMIA: ICD-10-CM

## 2025-03-26 DIAGNOSIS — E78.2 MIXED HYPERLIPIDEMIA: ICD-10-CM

## 2025-03-26 DIAGNOSIS — R93.1 ELEVATED CORONARY ARTERY CALCIUM SCORE: ICD-10-CM

## 2025-03-26 DIAGNOSIS — E66.811 CLASS 1 OBESITY DUE TO EXCESS CALORIES WITH SERIOUS COMORBIDITY AND BODY MASS INDEX (BMI) OF 30.0 TO 30.9 IN ADULT: Primary | ICD-10-CM

## 2025-03-26 PROCEDURE — 98006 SYNCH AUDIO-VIDEO EST MOD 30: CPT | Performed by: FAMILY MEDICINE

## 2025-03-26 RX ORDER — LAMOTRIGINE 100 MG/1
1 TABLET ORAL
COMMUNITY
Start: 2025-03-01

## 2025-03-26 RX ORDER — TOPIRAMATE 50 MG/1
1 TABLET, FILM COATED ORAL
COMMUNITY
Start: 2025-02-27

## 2025-03-26 RX ORDER — LAMOTRIGINE 200 MG/1
1 TABLET ORAL
COMMUNITY
Start: 2025-01-18

## 2025-03-26 NOTE — ASSESSMENT & PLAN NOTE
59 year old year old female in clinic today to discuss treatment of the following conditions through diet and lifestyle modification and weight loss:  1. Class 1 obesity due to excess calories with serious comorbidity and body mass index (BMI) of 30.0 to 30.9 in adult    2. Visit for screening mammogram    3. Binge eating    4. Hypertriglyceridemia    5. Prediabetes    6. Mixed hyperlipidemia    7. Elevated coronary artery calcium score      The patient's weight loss result since the last visit was unsuccessful based on weight gain.  In reviewing her dietary recall, she is actually not consuming an extreme excess of calories.  However, there are times during the day when she is consuming sugar.  She has dessert every night.  We discussed this in the context of insulin resistance and prediabetes.  When she wore a continuous glucose monitor number of years ago she experienced fluctuations.  I think it would be interesting to have her use a CGM.  We discussed how this could help her to  a diet that decreases fluctuations of blood sugar.  This is available over-the-counter through the website [employer].  We also reviewed pharmaceuticals.  She has not been on semaglutide.  She had intolerable side effects with tirzepatide.  I wonder if some of her side effects with tirzepatide might have been a result of ongoing consumption of small amounts of sugar.  She did lose weight, mostly because of the nausea factor.  We will continue Vyvanse.  It sounds like it is somewhat helpful.  Tentatively, we will recheck in a few months.  She is overdue for mammogram.  She will see her PCP next month.

## 2025-03-26 NOTE — PROGRESS NOTES
Angelina is a 59 year old who is being evaluated via a billable video visit.    How would you like to obtain your AVS? MyChart  If the video visit is dropped, the invitation should be resent by: Send to e-mail at: [email or web address]  Will anyone else be joining your video visit? No      Assessment & Plan   Problem List Items Addressed This Visit       Binge eating    Class 1 obesity due to excess calories with serious comorbidity and body mass index (BMI) of 30.0 to 30.9 in adult - Primary     59 year old year old female in clinic today to discuss treatment of the following conditions through diet and lifestyle modification and weight loss:  1. Class 1 obesity due to excess calories with serious comorbidity and body mass index (BMI) of 30.0 to 30.9 in adult    2. Visit for screening mammogram    3. Binge eating    4. Hypertriglyceridemia    5. Prediabetes    6. Mixed hyperlipidemia    7. Elevated coronary artery calcium score      The patient's weight loss result since the last visit was unsuccessful based on weight gain.  In reviewing her dietary recall, she is actually not consuming an extreme excess of calories.  However, there are times during the day when she is consuming sugar.  She has dessert every night.  We discussed this in the context of insulin resistance and prediabetes.  When she wore a continuous glucose monitor number of years ago she experienced fluctuations.  I think it would be interesting to have her use a CGM.  We discussed how this could help her to  a diet that decreases fluctuations of blood sugar.  This is available over-the-counter through the website [employer].  We also reviewed pharmaceuticals.  She has not been on semaglutide.  She had intolerable side effects with tirzepatide.  I wonder if some of her side effects with tirzepatide might have been a result of ongoing consumption of small amounts of sugar.  She did lose weight, mostly because of the nausea factor.  We  "will continue Vyvanse.  It sounds like it is somewhat helpful.  Tentatively, we will recheck in a few months.  She is overdue for mammogram.  She will see her PCP next month.         Elevated coronary artery calcium score    Hypertriglyceridemia    Mixed hyperlipidemia    Prediabetes     Other Visit Diagnoses       Visit for screening mammogram              BMI  Estimated body mass index is 30.67 kg/m  as calculated from the following:    Height as of 9/27/24: 1.651 m (5' 5\").    Weight as of 9/27/24: 83.6 kg (184 lb 4.8 oz).   Weight management plan: Discussed healthy diet and exercise guidelines    The longitudinal plan of care for the diagnosis(es)/condition(s) as documented were addressed during this visit. Due to the added complexity in care, I will continue to support Angelina in the subsequent management and with ongoing continuity of care.    33 minutes spent by me on the date of the encounter doing chart review, history and exam, documentation and further activities per the note      Subjective   Angelina is a 59 year old, presenting for the following health issues:  Weight Loss (Follow-up )        3/26/2025     6:52 AM   Additional Questions   Roomed by xl     Patient presents for treatment of chronic, comorbid conditions using intensive therapeutic lifestyle interventions including nutrition, physical activity, and behavior management.   - successes: no side effects like she had before   - struggles: gaining weight, getting exercise.    - movement: more exercise   - tracking/journaling: eats breakfast and lunch.  Breakfast protein bar. Lunch is a sandwich.  Snack is yogurt.  Dinner is protein and veggie.  Some starches.  Small desert   - following nutritional plan: yes.  Deviations from plan: rare   - hunger: Stable.     History of Present Illness       Reason for visit:  Continued help with weight loss    She eats 2-3 servings of fruits and vegetables daily.She consumes 2 sweetened beverage(s) " daily.She exercises with enough effort to increase her heart rate 20 to 29 minutes per day.  She exercises with enough effort to increase her heart rate 6 days per week.   She is taking medications regularly.            Objective    Vitals - Patient Reported  Weight (Patient Reported): 86.2 kg (190 lb)    Physical Exam   GENERAL: alert and no distress  EYES: Eyes grossly normal to inspection.  No discharge or erythema, or obvious scleral/conjunctival abnormalities.  RESP: No audible wheeze, cough, or visible cyanosis.    SKIN: Visible skin clear. No significant rash, abnormal pigmentation or lesions.  NEURO: Cranial nerves grossly intact.  Mentation and speech appropriate for age.  PSYCH: Appropriate affect, tone, and pace of words        Video-Visit Details    Type of service:  Video Visit   Originating Location (pt. Location): Home    Distant Location (provider location):  On-site  Platform used for Video Visit: Gaudencio  Signed Electronically by: Jacobo Aguilar MD

## 2025-03-26 NOTE — PATIENT INSTRUCTIONS
100+ grams of protein per day  800 grams of veggies/fruit day   Do not drink your calories    CGM -continuous glucose monitor: Prices vary.      - https://www.[employer]/ (OTC - from dexcom website)    Non-injectable options for weight loss:  primarily have the effect of reducing caloric intake.    Binge eating disorder: Vyvanse (AKA lisdexamfetamine).  This medication reduces impulsivity leading to binge eating behaviors.    Excess appetite: Phentermine, phentermine/topiramate, diethylpropion.  Historically these medications are the work horse of medical weight loss.  High-dose phentermine has a high risk of weight regain.  Phentermine can cause irritability, dry mouth.  These medications are pregnancy category X and we generally require patients to actively avoid pregnancy.  Principal benefit of phentermine or phentermine/topiramate is cost and ease of entry.    Reward pathway: Contrave (bupropion/naltrexone).  Both medicines work to blunt cravings.  They work by making it easier to adhere to overall lifestyle factors.    GLP1-ra options:    Wegovy (or Ozempic) aka semaglutide: cost per month retail ~$1400+      Zepbound (or Mounjaro) aka tirzepatide: cost per month retail ~$1100+       Saxenda (or Victoza) aka liraglutide: cost per month retail: ~$1400    Cash Pay Options for continuing GLP1/GIP agonists in 2025 (this is continuously evolving.  Check the relevant website):  Zepbound Vials through [facility] CASH PAY pharmacy -multiple changes in response to the end of availability of compounded semaglutide  $349/month for 2.5mg vials  $499/month for 5mg, 7.5mg and 10mg vials if enrolled in their program which requires the medication to be filled every 45 days  $5 per month for administrations supplies (syringe/needles, etc)   Wegovy Pens through [facility] cash pay mail order pharmacy  $499/month

## 2025-04-06 ENCOUNTER — HEALTH MAINTENANCE LETTER (OUTPATIENT)
Age: 60
End: 2025-04-06

## 2025-04-20 ENCOUNTER — MYC MEDICAL ADVICE (OUTPATIENT)
Dept: FAMILY MEDICINE | Facility: CLINIC | Age: 60
End: 2025-04-20
Payer: COMMERCIAL

## 2025-04-20 DIAGNOSIS — E66.09 CLASS 1 OBESITY DUE TO EXCESS CALORIES WITH SERIOUS COMORBIDITY AND BODY MASS INDEX (BMI) OF 30.0 TO 30.9 IN ADULT: ICD-10-CM

## 2025-04-20 DIAGNOSIS — E66.811 CLASS 1 OBESITY DUE TO EXCESS CALORIES WITH SERIOUS COMORBIDITY AND BODY MASS INDEX (BMI) OF 30.0 TO 30.9 IN ADULT: ICD-10-CM

## 2025-04-21 RX ORDER — LISDEXAMFETAMINE DIMESYLATE 60 MG/1
60 CAPSULE ORAL EVERY MORNING
Qty: 30 CAPSULE | Refills: 0 | Status: SHIPPED | OUTPATIENT
Start: 2025-04-21

## 2025-05-05 ENCOUNTER — E-VISIT (OUTPATIENT)
Dept: URGENT CARE | Facility: CLINIC | Age: 60
End: 2025-05-05
Payer: COMMERCIAL

## 2025-05-05 DIAGNOSIS — U07.1 INFECTION DUE TO COVID-19 VIRUS VARIANT OF CONCERN: Primary | ICD-10-CM

## 2025-05-06 ENCOUNTER — E-VISIT (OUTPATIENT)
Dept: URGENT CARE | Facility: CLINIC | Age: 60
End: 2025-05-06
Payer: COMMERCIAL

## 2025-05-06 DIAGNOSIS — U07.1 COVID: Primary | ICD-10-CM

## 2025-05-06 PROCEDURE — 99207 PR NON-BILLABLE SERV PER CHARTING: CPT | Performed by: EMERGENCY MEDICINE

## 2025-05-06 NOTE — PATIENT INSTRUCTIONS
Deawyatt Alfaro,    Thank you for submitting an evisit. I am sorry you are not feeling well. Based on your responses and your symptoms, you are eligible for treatment of your COVID-19 symptoms with Paxlovid. This is a medication that you will take twice daily for 5 days. To learn more information about Paxlovid, please visit: [email or web address]       If you take Paxlovid with other medicines, it could make you sick. This means that you need to stop taking some of your normal medicines while you're taking Paxlovid. Please carefully follow these directions so that you can take Paxlovid safely. You are taking:    Atorvastatin (Lipitor) for high cholesterol.  Stop taking atorvastatin and start taking Paxlovid the next day.  Do not take atorvastatin while you're taking Paxlovid.  Start taking Atorvastatin 3 days after you finish taking Paxlovid.     Another medication that I saw listed for you was Lamictal or lamotrigine.  I do not see where this was prescribed in the past and by what provider.  If you are taking this for a seizure medication I would advise that you reach out to your PCP or neurologist as the effects of Paxlovid may lower the medication strength and possible breakthrough seizures.    If you are not improving, having difficulty breathing, difficulty drinking or staying hydrated, or experiencing new or worsening symptoms, please call [phone] to speak to a triage nurse about your symptoms.     Paxlovid is no longer free from the government. There are financial assistance programs available. You can learn more at https://paxlovid.[employer] (Dro[employer]Lolapps/ or 1-936.757.4965, press 2 for patient options. Please look into this before you go to the pharmacy to  your medicine.    Your Paxlovid prescription was sent to the pharmacy you requested. If you have difficulty getting the medication at this pharmacy, the following [facility] carry Paxlovid. If you need to transfer your Paxlovid  prescription from your selected pharmacy, please call your preferred [facility] below and they can assist you in transferring your prescription.     [facility]: [phone] (M-F 8a-6p, Sat/Sun 8a-4p)  [place]: [phone] (M-F 9a-5p)  [place]: [phone] (M-F 8a-6p, Sat 9a-12:30p)  [place]: [phone] (M-F 8:30a-6p, Sat/Sun 8:30a-12:30p)  [place]: [phone] (M-F 7a-7p)  [place]: [phone] (M-Th 8a-5:30p, Fri 8a-5p)  [place]: [phone] (M-F 8a-5p)  [place]:  [phone] (M-F 830a-6p; Sat/Sun 9a-1p)  [facility]): [phone] (M-F 8a-7p, Sat/Sun 8a-5p)  [place]: [phone] (M-F 9a-7p, Sat/Sun 9a-1p)   [place]: [phone] (M-F 8a-8:30p, Sat 9a-5p, Sun 9a-4p)  [place]: [phone] (M-F 8a-5p, Sat 9a-12p)  [place]: [phone]  (M-F 9a-5p, Sat/Sun 8a-4p)  [place]: [phone] (M-F 8a-5p)  [phone] (M-F 8am-9p, Sat/Sun 9a-5p)    Please call [phone] if you have other questions.     CARLA Mendoza CNP      Coronavirus (COVID-19): Care Instructions  What is COVID-19?  COVID-19 is a disease caused by a type of coronavirus. This illness was first found in 2019 and has since spread worldwide (pandemic). Symptoms can range from mild, such as fever and body aches, to severe, including trouble breathing. COVID-19 can be deadly.  Coronaviruses are a large group of viruses. Some types cause the common cold. Others cause more serious illnesses like [place] respiratory syndrome (MERS) and severe acute respiratory syndrome (SARS).  Follow-up care is a key part of your treatment and safety. Be sure to make and go to all appointments, and call your doctor if you are having problems. It's also a good idea to know your test results and keep a list of the medicines you take.  How can you self-isolate when you have COVID-19?  If you have COVID-19, there are things you can do to help avoid spreading the virus to others.  Stay home,  and avoid contact with other people.  Limit contact with people in your home. If possible, stay in a separate bedroom and use a separate bathroom.  Wear a high-quality mask when you are around other people.  Improve airflow. If you have to spend time indoors with others, open windows and doors. Or you can use a fan to blow air away from people and out a window.  Avoid contact with pets and other animals.  Cover your mouth and nose with a tissue when you cough or sneeze. Then throw it in the trash right away.  Wash your hands often, especially after you cough or sneeze. Use soap and water, and scrub for at least 20 seconds. If soap and water aren't available, use an alcohol-based hand .  Don't share personal household items. These include bedding, towels, cups and glasses, and eating utensils.  Wash laundry in the warmest water allowed for the fabric type, and dry it completely. It's okay to wash other people's laundry with yours.  Clean and disinfect your home. Use household  and disinfectant wipes or sprays.  Go to the CDC website at cdc.gov if you have questions.  When can you end self-isolation for COVID-19?  If you know or think that you have the virus, you may need to self-isolate. When you can be around other people you live with and leave home depends on whether you have symptoms.  If you tested positive but had no symptoms, wear a mask for at least 5 days.  If you have symptoms, you need to wait until your symptoms are getting better and you haven't had a fever for 24 hours while not taking medicines to lower the fever. Once you leave isolation, wear a mask for at least 5 more days when you are around other people.  If you were very sick, were in the hospital for COVID, or have a weakened immune system, talk to your doctor about how long you should isolate and wear a mask. It might be longer than 5 days.  Call your doctor or seek care if you have questions about your symptoms or when to end  "isolation.  Check the CDC website at cdc.gov for the most current information.  Where can you learn more?  Go to https://www.ParentPlus.net/patiented  Enter C007 in the search box to learn more about \"Coronavirus (COVID-19): Care Instructions.\"  Current as of: February 28, 2025  Content Version: 14.4    [id number] [employer].   Care instructions adapted under license by your healthcare professional. If you have questions about a medical condition or this instruction, always ask your healthcare professional. [employer] disclaims any warranty or liability for your use of this information.    "

## 2025-05-20 ENCOUNTER — OFFICE VISIT (OUTPATIENT)
Dept: FAMILY MEDICINE | Facility: CLINIC | Age: 60
End: 2025-05-20
Payer: COMMERCIAL

## 2025-05-20 ENCOUNTER — RESULTS FOLLOW-UP (OUTPATIENT)
Dept: FAMILY MEDICINE | Facility: CLINIC | Age: 60
End: 2025-05-20

## 2025-05-20 VITALS
HEART RATE: 62 BPM | RESPIRATION RATE: 18 BRPM | OXYGEN SATURATION: 98 % | DIASTOLIC BLOOD PRESSURE: 70 MMHG | SYSTOLIC BLOOD PRESSURE: 114 MMHG | BODY MASS INDEX: 30.67 KG/M2 | TEMPERATURE: 98.2 F | HEIGHT: 65 IN

## 2025-05-20 DIAGNOSIS — R93.1 ELEVATED CORONARY ARTERY CALCIUM SCORE: ICD-10-CM

## 2025-05-20 DIAGNOSIS — E78.2 MIXED HYPERLIPIDEMIA: ICD-10-CM

## 2025-05-20 DIAGNOSIS — F33.1 MAJOR DEPRESSIVE DISORDER, RECURRENT, MODERATE (H): ICD-10-CM

## 2025-05-20 DIAGNOSIS — Z11.59 NEED FOR HEPATITIS C SCREENING TEST: ICD-10-CM

## 2025-05-20 DIAGNOSIS — N60.99 ATYPICAL DUCTAL HYPERPLASIA OF BREAST: ICD-10-CM

## 2025-05-20 DIAGNOSIS — E89.0 H/O PARTIAL THYROIDECTOMY: ICD-10-CM

## 2025-05-20 DIAGNOSIS — Z79.899 MEDICATION MANAGEMENT: ICD-10-CM

## 2025-05-20 DIAGNOSIS — C43.9 MELANOMA OF SKIN (H): ICD-10-CM

## 2025-05-20 DIAGNOSIS — Z12.31 ENCOUNTER FOR SCREENING MAMMOGRAM FOR BREAST CANCER: ICD-10-CM

## 2025-05-20 DIAGNOSIS — R73.03 PREDIABETES: ICD-10-CM

## 2025-05-20 DIAGNOSIS — E55.9 VITAMIN D DEFICIENCY: ICD-10-CM

## 2025-05-20 DIAGNOSIS — Z00.00 ENCOUNTER FOR ROUTINE HISTORY AND PHYSICAL EXAM IN FEMALE: Primary | ICD-10-CM

## 2025-05-20 DIAGNOSIS — R63.2 BINGE EATING: ICD-10-CM

## 2025-05-20 PROBLEM — G44.52 NEW DAILY PERSISTENT HEADACHE: Status: ACTIVE | Noted: 2025-05-20

## 2025-05-20 PROBLEM — Z82.62 FAMILY HISTORY OF OSTEOPOROSIS: Status: ACTIVE | Noted: 2025-05-20

## 2025-05-20 PROBLEM — M21.6X1 OTHER ACQUIRED DEFORMITIES OF RIGHT FOOT: Status: ACTIVE | Noted: 2025-05-20

## 2025-05-20 PROBLEM — R25.3 EYE MUSCLE TWITCHES: Status: ACTIVE | Noted: 2025-05-20

## 2025-05-20 PROBLEM — Z86.39 HISTORY OF HYPERGLYCEMIA: Status: ACTIVE | Noted: 2025-05-20

## 2025-05-20 PROBLEM — F33.41 MAJOR DEPRESSIVE DISORDER, RECURRENT, IN PARTIAL REMISSION: Status: ACTIVE | Noted: 2025-05-20

## 2025-05-20 PROBLEM — Z86.39 H/O THYROID NODULE: Status: ACTIVE | Noted: 2025-05-20

## 2025-05-20 PROBLEM — G43.009 MIGRAINE WITHOUT AURA AND WITHOUT STATUS MIGRAINOSUS, NOT INTRACTABLE: Status: ACTIVE | Noted: 2025-05-20

## 2025-05-20 PROBLEM — M21.6X2 OTHER ACQUIRED DEFORMITIES OF LEFT FOOT: Status: ACTIVE | Noted: 2025-05-20

## 2025-05-20 PROBLEM — F33.42 MAJOR DEPRESSIVE DISORDER, RECURRENT, IN FULL REMISSION: Status: ACTIVE | Noted: 2025-05-20

## 2025-05-20 LAB
ALBUMIN UR-MCNC: NEGATIVE MG/DL
APPEARANCE UR: CLEAR
BACTERIA #/AREA URNS HPF: ABNORMAL /HPF
BILIRUB UR QL STRIP: NEGATIVE
COLOR UR AUTO: YELLOW
GLUCOSE UR STRIP-MCNC: NEGATIVE MG/DL
HGB UR QL STRIP: NEGATIVE
KETONES UR STRIP-MCNC: NEGATIVE MG/DL
LEUKOCYTE ESTERASE UR QL STRIP: ABNORMAL
NITRATE UR QL: NEGATIVE
PH UR STRIP: 6 [PH] (ref 5–8)
RBC #/AREA URNS AUTO: ABNORMAL /HPF
SP GR UR STRIP: 1.02 (ref 1–1.03)
SQUAMOUS #/AREA URNS AUTO: ABNORMAL /LPF
UROBILINOGEN UR STRIP-ACNC: 0.2 E.U./DL
WBC #/AREA URNS AUTO: ABNORMAL /HPF

## 2025-05-20 PROCEDURE — 81001 URINALYSIS AUTO W/SCOPE: CPT | Performed by: NURSE PRACTITIONER

## 2025-05-20 PROCEDURE — 90471 IMMUNIZATION ADMIN: CPT | Performed by: NURSE PRACTITIONER

## 2025-05-20 PROCEDURE — 1126F AMNT PAIN NOTED NONE PRSNT: CPT | Performed by: NURSE PRACTITIONER

## 2025-05-20 PROCEDURE — 99396 PREV VISIT EST AGE 40-64: CPT | Mod: 25 | Performed by: NURSE PRACTITIONER

## 2025-05-20 PROCEDURE — 90677 PCV20 VACCINE IM: CPT | Performed by: NURSE PRACTITIONER

## 2025-05-20 PROCEDURE — 3078F DIAST BP <80 MM HG: CPT | Performed by: NURSE PRACTITIONER

## 2025-05-20 PROCEDURE — 3074F SYST BP LT 130 MM HG: CPT | Performed by: NURSE PRACTITIONER

## 2025-05-20 RX ORDER — ATORVASTATIN CALCIUM 40 MG/1
40 TABLET, FILM COATED ORAL DAILY
Qty: 90 TABLET | Refills: 0 | Status: SHIPPED | OUTPATIENT
Start: 2025-05-20

## 2025-05-20 SDOH — HEALTH STABILITY: PHYSICAL HEALTH: ON AVERAGE, HOW MANY DAYS PER WEEK DO YOU ENGAGE IN MODERATE TO STRENUOUS EXERCISE (LIKE A BRISK WALK)?: 6 DAYS

## 2025-05-20 SDOH — HEALTH STABILITY: PHYSICAL HEALTH: ON AVERAGE, HOW MANY MINUTES DO YOU ENGAGE IN EXERCISE AT THIS LEVEL?: 20 MIN

## 2025-05-20 ASSESSMENT — PAIN SCALES - GENERAL: PAINLEVEL_OUTOF10: NO PAIN (0)

## 2025-05-20 ASSESSMENT — SOCIAL DETERMINANTS OF HEALTH (SDOH): HOW OFTEN DO YOU GET TOGETHER WITH FRIENDS OR RELATIVES?: ONCE A WEEK

## 2025-05-20 NOTE — PROGRESS NOTES
Preventive Care Visit  [facility]  CARLA Larios CNP, Family Medicine  May 20, 2025      Assessment & Plan     Encounter for routine history and physical exam in female  Recommend consuming a healthy diet and exercising.  Pneumococcal vaccine provided.  She plans on following up for tetanus vaccine.  Hepatitis B vaccine titer ordered.  Hepatitis C screening ordered.  She is due for mammogram.  She is up-to-date on cervical cancer and colorectal cancer screening.  - CBC with platelets  - UA Macroscopic with reflex to Microscopic and Culture  - Hepatitis B Surface Antibody  - UA Macroscopic with reflex to Microscopic and Culture    Need for hepatitis C screening test  - Hepatitis C Screen Reflex to HCV RNA Quant and Genotype    Encounter for screening mammogram for breast cancer  - MA Screen Bilateral w/Sergio    Atypical ductal hyperplasia of breast  Mammogram ordered.    Mixed hyperlipidemia  Goal LDL is less than 70.  She continues atorvastatin.  - atorvastatin (LIPITOR) 40 MG tablet  Dispense: 90 tablet; Refill: 0  - Lipid panel reflex to direct LDL Fasting    Elevated coronary artery calcium score  Will LDL is less than 70.  She continues atorvastatin.  - Lipid panel reflex to direct LDL Non-fasting    Prediabetes  Will check A1c.  - Hemoglobin A1c    Vitamin D deficiency  She is not consistent with taking vitamin D.  Will notify patient of results.  - Vitamin D Deficiency    Major depressive disorder, recurrent, moderate (H)  Patient is followed by psychiatry.  She continues sertraline and lamotrigine.    Melanoma of skin (H)  Patient is followed by dermatology.    Binge eating  She continues Vyvanse.  She is seeing the weight loss clinic.    H/O partial thyroidectomy  Will check thyroid cascade.  - TSH with free T4 reflex    Medication management  - Comprehensive metabolic panel          Counseling  Appropriate preventive services were addressed with this patient via screening,  questionnaire, or discussion as appropriate for fall prevention, nutrition, physical activity, Tobacco-use cessation, social engagement, weight loss and cognition.  Checklist reviewing preventive services available has been given to the patient.  Reviewed patient's diet, addressing concerns and/or questions.       Cipriano Alfaro is a 59 year old, presenting for the following:  Physical and Referral        5/20/2025     3:16 PM   Additional Questions   Roomed by Jolly         5/20/2025   Declines Weight   Did patient decline having their weight taken? Yes        Via the Health Maintenance questionnaire, the patient has reported the following services have been completed -Mammogram: Emma 2024-08-05, this information has been sent to the abstraction team.    HPI  LMP: in her early 50's, no bleeding since   Hx of abnormal pap smear: 22 years ago, colposcopy; normal since   Last pap smear: 1/26/25 normal, negative HPV   Perform self-breast exams: yes   Vaginal discharge or irritation: none  Sexually active: yes,  since 1994   Contraception: none   Concerns for STDs: none   Previous pregnancies:three pregnancies (two vaginal, 1 miscarriage)  One daughter and one son (ages 25 and 21)      Patient has a history of atypical ductal hyperplasia within the right breast 16 years ago via a breast biopsy.  The patient was seen at [facility] and has a breast MRI and mammogram every 6 months.  Patient is due for mammogram.  Patient has a history of an elevated cardiac calcium score of 145 on 6/21/2022.  She is taking atorvastatin 40 mg daily.  Her father had elevated cholesterol and a stroke.  She denies chest pain, shortness of breath with his reduction, edema, orthopnea, syncope.  Patient is taking bupropion, lamotrigine for anxiety and depression.  She feels as though her mood is stable.  She denies thoughts of suicide.  She feels supported.  She admits to some grief as her brother passed away from a  sarcoma 2 weeks ago.  At the same time, she was diagnosed with COVID.  COVID symptoms have improved.  She sees neuro and neurology for a cough headache.  She takes topiramate daily.  She has a history of melanoma and is followed by advanced dermatology.  Patient is taking Vyvanse for binge eating.  This is prescribed by weight loss clinic.  She has a history of vitamin D deficiency and is inconsistent with taking vitamin D.  She has a history of prediabetes.    Advance Care Planning    Discussed advance care planning with patient; informed AVS has link to [employer] Choices.        5/20/2025   General Health   How would you rate your overall physical health? Good   Feel stress (tense, anxious, or unable to sleep) Only a little   (!) STRESS CONCERN      5/20/2025   Nutrition   Three or more servings of calcium each day? Yes   Diet: Regular (no restrictions)   How many servings of fruit and vegetables per day? (!) 2-3   How many sweetened beverages each day? (!) 2         5/20/2025   Exercise   Days per week of moderate/strenous exercise 6 days   Average minutes spent exercising at this level 20 min         5/20/2025   Social Factors   Frequency of gathering with friends or relatives Once a week   Worry food won't last until get money to buy more No   Food not last or not have enough money for food? No   Do you have housing? (Housing is defined as stable permanent housing and does not include staying outside in a car, in a tent, in an abandoned building, in an overnight shelter, or couch-surfing.) Yes   Are you worried about losing your housing? No   Lack of transportation? No   Unable to get utilities (heat,electricity)? No         5/20/2025   Fall Risk   Fallen 2 or more times in the past year? No   Trouble with walking or balance? No          5/20/2025   Dental   Dentist two times every year? Yes         Today's PHQ-9 Score:       12/23/2024     6:40 AM   PHQ-9 SCORE   PHQ-9 Total Score MyChart 0   PHQ-9 Total Score  0        Patient-reported           5/20/2025   Substance Use   Alcohol more than 3/day or more than 7/wk Not Applicable   Do you use any other substances recreationally? No     Social History     Tobacco Use    Smoking status: Never     Passive exposure: Never    Smokeless tobacco: Never   Vaping Use    Vaping status: Never Used   Substance Use Topics    Alcohol use: Not Currently    Drug use: Never          Mammogram Screening - Annual screen due to greater than 20% lifetime risk as estimated by Breast Cancer Risk Calculator        5/20/2025   STI Screening   New sexual partner(s) since last STI/HIV test? No     History of abnormal Pap smear: yes - age 30-64 HPV with reflex Pap every 3 years recommended        Latest Ref Rng & Units 1/26/2024     2:33 PM 12/5/2018     9:29 AM   PAP / HPV   PAP  Negative for Intraepithelial Lesion or Malignancy (NILM)  Negative for squamous intraepithelial lesion or malignancy  Electronically signed by Chanelle Han CT (ASCP) on 12/12/2018 at  3:10 PM      HPV 16 DNA Negative Negative  Negative    HPV 18 DNA Negative Negative  Negative    Other HR HPV Negative Negative  Negative      ASCVD Risk   The 10-year ASCVD risk score (Darius AREVALO, et al., 2019) is: 1.9%    Values used to calculate the score:      Age: 59 years      Sex: Female      Is Non- : No      Diabetic: No      Tobacco smoker: No      Systolic Blood Pressure: 114 mmHg      Is BP treated: No      HDL Cholesterol: 81 mg/dL      Total Cholesterol: 212 mg/dL         Reviewed and updated as needed this visit by Provider                    Past Medical History:   Diagnosis Date    Depressive disorder 2018     Past Surgical History:   Procedure Laterality Date    BREAST BIOPSY, CORE RT/LT      CHOLECYSTECTOMY      COLONOSCOPY  2023    SURGICAL PATHOLOGY EXAM      THYROIDECTOMY, PARTIAL       Current Outpatient Medications   Medication Sig Dispense Refill    atorvastatin (LIPITOR) 40 MG  "tablet Take 1 tablet (40 mg) by mouth daily. 90 tablet 0    Calcium Carbonate-Vitamin D 250-3.125 MG-MCG TABS Calcium Carb-Magnesium-Vit D2:  Take 1 cap by mouth.      lamoTRIgine (LAMICTAL) 100 MG tablet Take 1 tablet by mouth daily at 2 pm.      lamoTRIgine (LAMICTAL) 200 MG tablet Take 1 tablet by mouth daily at 2 pm.      lisdexamfetamine (VYVANSE) 60 MG capsule Take 1 capsule (60 mg) by mouth every morning. 30 capsule 0    sertraline (ZOLOFT) 100 MG tablet Take 200 mg by mouth daily      topiramate (TOPAMAX) 50 MG tablet Take 1 tablet by mouth 2 times daily.           Review of Systems  Constitutional, HEENT, cardiovascular, pulmonary, GI, , musculoskeletal, neuro, skin, endocrine and psych systems are negative, except as otherwise noted.     Objective    Exam  /70   Pulse 62   Temp 98.2  F (36.8  C)   Resp 18   Ht 1.651 m (5' 5\")   SpO2 98%   BMI 30.67 kg/m     Estimated body mass index is 30.67 kg/m  as calculated from the following:    Height as of this encounter: 1.651 m (5' 5\").    Weight as of 9/27/24: 83.6 kg (184 lb 4.8 oz).    Physical Exam  GENERAL: alert and no distress  EYES: Eyes grossly normal to inspection, PERRL and conjunctivae and sclerae normal  HENT: ear canals and TM's normal, nose and mouth without ulcers or lesions  NECK: no adenopathy, no asymmetry, masses, or scars  RESP: lungs clear to auscultation - no rales, rhonchi or wheezes  CV: regular rate and rhythm, normal S1 S2, no S3 or S4, no murmur, click or rub, no peripheral edema  ABDOMEN: soft, nontender, no hepatosplenomegaly, no masses and bowel sounds normal  MS: no gross musculoskeletal defects noted, no edema  SKIN: no suspicious lesions or rashes  NEURO: Normal strength and tone, mentation intact and speech normal  PSYCH: mentation appears normal, affect normal/bright        Signed Electronically by: CARLA Larios CNP    "

## 2025-05-21 ENCOUNTER — PATIENT OUTREACH (OUTPATIENT)
Dept: CARE COORDINATION | Facility: CLINIC | Age: 60
End: 2025-05-21
Payer: COMMERCIAL

## 2025-06-03 ENCOUNTER — MYC MEDICAL ADVICE (OUTPATIENT)
Dept: FAMILY MEDICINE | Facility: CLINIC | Age: 60
End: 2025-06-03
Payer: COMMERCIAL

## 2025-06-03 DIAGNOSIS — E66.09 CLASS 1 OBESITY DUE TO EXCESS CALORIES WITH SERIOUS COMORBIDITY AND BODY MASS INDEX (BMI) OF 30.0 TO 30.9 IN ADULT: ICD-10-CM

## 2025-06-03 DIAGNOSIS — E66.811 CLASS 1 OBESITY DUE TO EXCESS CALORIES WITH SERIOUS COMORBIDITY AND BODY MASS INDEX (BMI) OF 30.0 TO 30.9 IN ADULT: ICD-10-CM

## 2025-06-03 RX ORDER — LISDEXAMFETAMINE DIMESYLATE 60 MG/1
60 CAPSULE ORAL EVERY MORNING
Qty: 30 CAPSULE | Refills: 0 | Status: SHIPPED | OUTPATIENT
Start: 2025-06-03

## 2025-06-03 NOTE — TELEPHONE ENCOUNTER
"Vyvanse refill request.    Per last [facility] notes from 3/26/25:    \"Binge eating     Class 1 obesity due to excess calories with serious comorbidity and body mass index (BMI) of 30.0 to 30.9 in adult - Primary        59 year old year old female in clinic today to discuss treatment of the following conditions through diet and lifestyle modification and weight loss:  1. Class 1 obesity due to excess calories with serious comorbidity and body mass index (BMI) of 30.0 to 30.9 in adult    2. Visit for screening mammogram    3. Binge eating    4. Hypertriglyceridemia    5. Prediabetes    6. Mixed hyperlipidemia    7. Elevated coronary artery calcium score       The patient's weight loss result since the last visit was unsuccessful based on weight gain.  In reviewing her dietary recall, she is actually not consuming an extreme excess of calories.  However, there are times during the day when she is consuming sugar.  She has dessert every night.  We discussed this in the context of insulin resistance and prediabetes.  When she wore a continuous glucose monitor number of years ago she experienced fluctuations.  I think it would be interesting to have her use a CGM.  We discussed how this could help her to  a diet that decreases fluctuations of blood sugar.  This is available over-the-counter through the website [employer].  We also reviewed pharmaceuticals.  She has not been on semaglutide.  She had intolerable side effects with tirzepatide.  I wonder if some of her side effects with tirzepatide might have been a result of ongoing consumption of small amounts of sugar.  She did lose weight, mostly because of the nausea factor.  We will continue Vyvanse.  It sounds like it is somewhat helpful.  Tentatively, we will recheck in a few months.  She is overdue for mammogram.  She will see her PCP next month.\"                  Anne-Marie Welsh RN  [facility]   "

## 2025-06-12 ENCOUNTER — LAB (OUTPATIENT)
Dept: LAB | Facility: CLINIC | Age: 60
End: 2025-06-12
Payer: COMMERCIAL

## 2025-06-12 DIAGNOSIS — E89.0 H/O PARTIAL THYROIDECTOMY: ICD-10-CM

## 2025-06-12 DIAGNOSIS — Z79.899 MEDICATION MANAGEMENT: ICD-10-CM

## 2025-06-12 DIAGNOSIS — R73.03 PREDIABETES: ICD-10-CM

## 2025-06-12 DIAGNOSIS — R93.1 ELEVATED CORONARY ARTERY CALCIUM SCORE: ICD-10-CM

## 2025-06-12 DIAGNOSIS — Z00.00 ENCOUNTER FOR ROUTINE HISTORY AND PHYSICAL EXAM IN FEMALE: ICD-10-CM

## 2025-06-12 DIAGNOSIS — Z11.59 NEED FOR HEPATITIS C SCREENING TEST: ICD-10-CM

## 2025-06-12 DIAGNOSIS — E78.2 MIXED HYPERLIPIDEMIA: ICD-10-CM

## 2025-06-12 DIAGNOSIS — E55.9 VITAMIN D DEFICIENCY: ICD-10-CM

## 2025-06-12 LAB
ALBUMIN SERPL BCG-MCNC: 4.3 G/DL (ref 3.5–5.2)
ALP SERPL-CCNC: 66 U/L (ref 40–150)
ALT SERPL W P-5'-P-CCNC: 19 U/L (ref 0–50)
ANION GAP SERPL CALCULATED.3IONS-SCNC: 12 MMOL/L (ref 7–15)
AST SERPL W P-5'-P-CCNC: 22 U/L (ref 0–45)
BILIRUB SERPL-MCNC: 0.3 MG/DL
BUN SERPL-MCNC: 15.9 MG/DL (ref 8–23)
CALCIUM SERPL-MCNC: 9.8 MG/DL (ref 8.8–10.4)
CHLORIDE SERPL-SCNC: 109 MMOL/L (ref 98–107)
CHOLEST SERPL-MCNC: 189 MG/DL
CREAT SERPL-MCNC: 1.06 MG/DL (ref 0.51–0.95)
EGFRCR SERPLBLD CKD-EPI 2021: 60 ML/MIN/1.73M2
ERYTHROCYTE [DISTWIDTH] IN BLOOD BY AUTOMATED COUNT: 14.1 % (ref 10–15)
EST. AVERAGE GLUCOSE BLD GHB EST-MCNC: 114 MG/DL
FASTING STATUS PATIENT QL REPORTED: YES
FASTING STATUS PATIENT QL REPORTED: YES
GLUCOSE SERPL-MCNC: 96 MG/DL (ref 70–99)
HBA1C MFR BLD: 5.6 % (ref 0–5.6)
HBV SURFACE AB SERPL IA-ACNC: <3.5 M[IU]/ML
HBV SURFACE AB SERPL IA-ACNC: NONREACTIVE M[IU]/ML
HCO3 SERPL-SCNC: 21 MMOL/L (ref 22–29)
HCT VFR BLD AUTO: 35.7 % (ref 35–47)
HCV AB SERPL QL IA: NONREACTIVE
HDLC SERPL-MCNC: 83 MG/DL
HGB BLD-MCNC: 11.8 G/DL (ref 11.7–15.7)
LDLC SERPL CALC-MCNC: 92 MG/DL
MCH RBC QN AUTO: 30.5 PG (ref 26.5–33)
MCHC RBC AUTO-ENTMCNC: 33.1 G/DL (ref 31.5–36.5)
MCV RBC AUTO: 92 FL (ref 78–100)
NONHDLC SERPL-MCNC: 106 MG/DL
PLATELET # BLD AUTO: 241 10E3/UL (ref 150–450)
POTASSIUM SERPL-SCNC: 4.4 MMOL/L (ref 3.4–5.3)
PROT SERPL-MCNC: 6.6 G/DL (ref 6.4–8.3)
RBC # BLD AUTO: 3.87 10E6/UL (ref 3.8–5.2)
SODIUM SERPL-SCNC: 142 MMOL/L (ref 135–145)
TRIGL SERPL-MCNC: 69 MG/DL
TSH SERPL DL<=0.005 MIU/L-ACNC: 2.78 UIU/ML (ref 0.3–4.2)
VIT D+METAB SERPL-MCNC: 47 NG/ML (ref 20–50)
WBC # BLD AUTO: 7.1 10E3/UL (ref 4–11)

## 2025-06-27 ENCOUNTER — HOSPITAL ENCOUNTER (OUTPATIENT)
Dept: MAMMOGRAPHY | Facility: CLINIC | Age: 60
Discharge: HOME | End: 2025-06-27
Attending: NURSE PRACTITIONER | Admitting: NURSE PRACTITIONER
Payer: COMMERCIAL

## 2025-06-27 DIAGNOSIS — Z12.31 ENCOUNTER FOR SCREENING MAMMOGRAM FOR BREAST CANCER: ICD-10-CM

## 2025-06-27 PROCEDURE — 77063 BREAST TOMOSYNTHESIS BI: CPT

## 2025-07-01 ENCOUNTER — MYC MEDICAL ADVICE (OUTPATIENT)
Dept: FAMILY MEDICINE | Facility: CLINIC | Age: 60
End: 2025-07-01
Payer: COMMERCIAL

## 2025-07-01 DIAGNOSIS — E66.811 CLASS 1 OBESITY DUE TO EXCESS CALORIES WITH SERIOUS COMORBIDITY AND BODY MASS INDEX (BMI) OF 30.0 TO 30.9 IN ADULT: ICD-10-CM

## 2025-07-01 DIAGNOSIS — E66.09 CLASS 1 OBESITY DUE TO EXCESS CALORIES WITH SERIOUS COMORBIDITY AND BODY MASS INDEX (BMI) OF 30.0 TO 30.9 IN ADULT: ICD-10-CM

## 2025-07-01 RX ORDER — LISDEXAMFETAMINE DIMESYLATE 30 MG/1
60 CAPSULE ORAL EVERY MORNING
Qty: 60 CAPSULE | Refills: 0 | Status: SHIPPED | OUTPATIENT
Start: 2025-07-01

## 2025-07-25 PROBLEM — R25.3 FASCICULATION: Status: ACTIVE | Noted: 2025-07-25

## 2025-08-04 DIAGNOSIS — R06.09 DYSPNEA ON EXERTION: Primary | ICD-10-CM

## 2025-08-22 ENCOUNTER — HOSPITAL ENCOUNTER (OUTPATIENT)
Dept: CARDIOLOGY | Facility: HOSPITAL | Age: 60
Discharge: HOME | End: 2025-08-22
Attending: NURSE PRACTITIONER | Admitting: NURSE PRACTITIONER
Payer: COMMERCIAL

## 2025-08-22 DIAGNOSIS — R06.09 DYSPNEA ON EXERTION: ICD-10-CM

## 2025-08-22 PROBLEM — I35.1 MILD AORTIC REGURGITATION: Status: ACTIVE | Noted: 2025-08-22

## 2025-08-22 LAB — LVEF ECHO: NORMAL

## 2025-08-22 PROCEDURE — 93325 DOPPLER ECHO COLOR FLOW MAPG: CPT | Mod: TC

## 2025-08-22 PROCEDURE — 93350 STRESS TTE ONLY: CPT | Mod: 26 | Performed by: INTERNAL MEDICINE

## 2025-08-22 PROCEDURE — 93321 DOPPLER ECHO F-UP/LMTD STD: CPT | Mod: 26 | Performed by: INTERNAL MEDICINE

## 2025-08-22 PROCEDURE — 93018 CV STRESS TEST I&R ONLY: CPT | Performed by: INTERNAL MEDICINE

## 2025-08-22 PROCEDURE — 93325 DOPPLER ECHO COLOR FLOW MAPG: CPT | Mod: 26 | Performed by: INTERNAL MEDICINE

## 2025-08-22 PROCEDURE — 93016 CV STRESS TEST SUPVJ ONLY: CPT | Performed by: INTERNAL MEDICINE

## 2025-08-28 ENCOUNTER — DOCUMENTATION ONLY (OUTPATIENT)
Dept: FAMILY MEDICINE | Facility: CLINIC | Age: 60
End: 2025-08-28
Payer: COMMERCIAL

## 2025-08-30 ENCOUNTER — LAB (OUTPATIENT)
Dept: LAB | Facility: CLINIC | Age: 60
End: 2025-08-30
Payer: COMMERCIAL

## 2025-08-30 DIAGNOSIS — R79.89 ELEVATED SERUM CREATININE: ICD-10-CM

## 2025-08-30 LAB
ANION GAP SERPL CALCULATED.3IONS-SCNC: 12 MMOL/L (ref 7–15)
BUN SERPL-MCNC: 12.1 MG/DL (ref 8–23)
CALCIUM SERPL-MCNC: 9.4 MG/DL (ref 8.8–10.4)
CHLORIDE SERPL-SCNC: 105 MMOL/L (ref 98–107)
CREAT SERPL-MCNC: 0.94 MG/DL (ref 0.51–0.95)
EGFRCR SERPLBLD CKD-EPI 2021: 70 ML/MIN/1.73M2
GLUCOSE SERPL-MCNC: 109 MG/DL (ref 70–99)
HCO3 SERPL-SCNC: 22 MMOL/L (ref 22–29)
POTASSIUM SERPL-SCNC: 3.6 MMOL/L (ref 3.4–5.3)
SODIUM SERPL-SCNC: 139 MMOL/L (ref 135–145)

## 2025-08-30 PROCEDURE — 36415 COLL VENOUS BLD VENIPUNCTURE: CPT

## 2025-08-30 PROCEDURE — 80048 BASIC METABOLIC PNL TOTAL CA: CPT
